# Patient Record
Sex: MALE | Race: WHITE | NOT HISPANIC OR LATINO | Employment: UNEMPLOYED | URBAN - METROPOLITAN AREA
[De-identification: names, ages, dates, MRNs, and addresses within clinical notes are randomized per-mention and may not be internally consistent; named-entity substitution may affect disease eponyms.]

---

## 2023-05-16 ENCOUNTER — HOSPITAL ENCOUNTER (EMERGENCY)
Facility: HOSPITAL | Age: 43
End: 2023-05-16
Attending: EMERGENCY MEDICINE

## 2023-05-16 ENCOUNTER — HOSPITAL ENCOUNTER (INPATIENT)
Facility: HOSPITAL | Age: 43
LOS: 3 days | Discharge: HOME | End: 2023-05-19
Attending: STUDENT IN AN ORGANIZED HEALTH CARE EDUCATION/TRAINING PROGRAM | Admitting: STUDENT IN AN ORGANIZED HEALTH CARE EDUCATION/TRAINING PROGRAM

## 2023-05-16 VITALS
SYSTOLIC BLOOD PRESSURE: 132 MMHG | TEMPERATURE: 98 F | OXYGEN SATURATION: 99 % | WEIGHT: 200 LBS | HEIGHT: 70 IN | HEART RATE: 70 BPM | BODY MASS INDEX: 28.63 KG/M2 | DIASTOLIC BLOOD PRESSURE: 74 MMHG | RESPIRATION RATE: 18 BRPM

## 2023-05-16 DIAGNOSIS — Z00.8 MEDICAL CLEARANCE FOR PSYCHIATRIC ADMISSION: Primary | ICD-10-CM

## 2023-05-16 DIAGNOSIS — Z00.8 ENCOUNTER FOR PSYCHOLOGICAL EVALUATION: ICD-10-CM

## 2023-05-16 DIAGNOSIS — S61.512A LACERATION OF LEFT WRIST, INITIAL ENCOUNTER: ICD-10-CM

## 2023-05-16 DIAGNOSIS — F33.9 RECURRENT MAJOR DEPRESSIVE DISORDER, REMISSION STATUS UNSPECIFIED (HCC): Primary | ICD-10-CM

## 2023-05-16 DIAGNOSIS — Z00.8 MEDICAL CLEARANCE FOR PSYCHIATRIC ADMISSION: ICD-10-CM

## 2023-05-16 LAB
AMPHETAMINES SERPL QL SCN: POSITIVE
BARBITURATES UR QL: NEGATIVE
BENZODIAZ UR QL: NEGATIVE
COCAINE UR QL: NEGATIVE
ETHANOL EXG-MCNC: 0 MG/DL
METHADONE UR QL: NEGATIVE
OPIATES UR QL SCN: NEGATIVE
OXYCODONE+OXYMORPHONE UR QL SCN: NEGATIVE
PCP UR QL: NEGATIVE
SARS-COV-2 RNA RESP QL NAA+PROBE: NEGATIVE
THC UR QL: NEGATIVE

## 2023-05-16 RX ORDER — LORAZEPAM 2 MG/ML
1 INJECTION INTRAMUSCULAR
Status: CANCELLED | OUTPATIENT
Start: 2023-05-16

## 2023-05-16 RX ORDER — ACETAMINOPHEN 325 MG/1
975 TABLET ORAL EVERY 6 HOURS PRN
Status: DISCONTINUED | OUTPATIENT
Start: 2023-05-16 | End: 2023-05-19 | Stop reason: HOSPADM

## 2023-05-16 RX ORDER — HALOPERIDOL 5 MG/1
5 TABLET ORAL
Status: DISCONTINUED | OUTPATIENT
Start: 2023-05-16 | End: 2023-05-19 | Stop reason: HOSPADM

## 2023-05-16 RX ORDER — BENZTROPINE MESYLATE 1 MG/1
1 TABLET ORAL
Status: CANCELLED | OUTPATIENT
Start: 2023-05-16

## 2023-05-16 RX ORDER — TRAZODONE HYDROCHLORIDE 50 MG/1
50 TABLET ORAL
Status: DISCONTINUED | OUTPATIENT
Start: 2023-05-16 | End: 2023-05-19 | Stop reason: HOSPADM

## 2023-05-16 RX ORDER — HYDROXYZINE HYDROCHLORIDE 25 MG/1
25 TABLET, FILM COATED ORAL
Status: CANCELLED | OUTPATIENT
Start: 2023-05-16

## 2023-05-16 RX ORDER — DEXTROAMPHETAMINE SACCHARATE, AMPHETAMINE ASPARTATE, DEXTROAMPHETAMINE SULFATE AND AMPHETAMINE SULFATE 5; 5; 5; 5 MG/1; MG/1; MG/1; MG/1
20 TABLET ORAL
COMMUNITY

## 2023-05-16 RX ORDER — HALOPERIDOL 2 MG/1
2 TABLET ORAL
Status: DISCONTINUED | OUTPATIENT
Start: 2023-05-16 | End: 2023-05-19 | Stop reason: HOSPADM

## 2023-05-16 RX ORDER — LORAZEPAM 2 MG/ML
2 INJECTION INTRAMUSCULAR
Status: CANCELLED | OUTPATIENT
Start: 2023-05-16

## 2023-05-16 RX ORDER — HYDROXYZINE HYDROCHLORIDE 25 MG/1
100 TABLET, FILM COATED ORAL
Status: CANCELLED | OUTPATIENT
Start: 2023-05-16

## 2023-05-16 RX ORDER — HYDROXYZINE HYDROCHLORIDE 25 MG/1
50 TABLET, FILM COATED ORAL
Status: CANCELLED | OUTPATIENT
Start: 2023-05-16

## 2023-05-16 RX ORDER — TRAZODONE HYDROCHLORIDE 50 MG/1
50 TABLET ORAL
Status: CANCELLED | OUTPATIENT
Start: 2023-05-16

## 2023-05-16 RX ORDER — LORAZEPAM 2 MG/ML
2 INJECTION INTRAMUSCULAR
Status: DISCONTINUED | OUTPATIENT
Start: 2023-05-16 | End: 2023-05-19 | Stop reason: HOSPADM

## 2023-05-16 RX ORDER — BENZTROPINE MESYLATE 1 MG/ML
0.5 INJECTION INTRAMUSCULAR; INTRAVENOUS
Status: CANCELLED | OUTPATIENT
Start: 2023-05-16

## 2023-05-16 RX ORDER — BENZTROPINE MESYLATE 1 MG/1
1 TABLET ORAL
Status: DISCONTINUED | OUTPATIENT
Start: 2023-05-16 | End: 2023-05-19 | Stop reason: HOSPADM

## 2023-05-16 RX ORDER — HALOPERIDOL 5 MG/ML
5 INJECTION INTRAMUSCULAR
Status: DISCONTINUED | OUTPATIENT
Start: 2023-05-16 | End: 2023-05-19 | Stop reason: HOSPADM

## 2023-05-16 RX ORDER — AMOXICILLIN 250 MG
1 CAPSULE ORAL DAILY PRN
Status: DISCONTINUED | OUTPATIENT
Start: 2023-05-16 | End: 2023-05-19 | Stop reason: HOSPADM

## 2023-05-16 RX ORDER — MAGNESIUM HYDROXIDE/ALUMINUM HYDROXICE/SIMETHICONE 120; 1200; 1200 MG/30ML; MG/30ML; MG/30ML
30 SUSPENSION ORAL EVERY 4 HOURS PRN
Status: DISCONTINUED | OUTPATIENT
Start: 2023-05-16 | End: 2023-05-19 | Stop reason: HOSPADM

## 2023-05-16 RX ORDER — HALOPERIDOL 2 MG/1
2 TABLET ORAL
Status: CANCELLED | OUTPATIENT
Start: 2023-05-16

## 2023-05-16 RX ORDER — LORAZEPAM 0.5 MG/1
0.5 TABLET ORAL ONCE
Status: COMPLETED | OUTPATIENT
Start: 2023-05-16 | End: 2023-05-16

## 2023-05-16 RX ORDER — BENZTROPINE MESYLATE 1 MG/ML
1 INJECTION INTRAMUSCULAR; INTRAVENOUS
Status: DISCONTINUED | OUTPATIENT
Start: 2023-05-16 | End: 2023-05-19 | Stop reason: HOSPADM

## 2023-05-16 RX ORDER — BENZTROPINE MESYLATE 1 MG/ML
0.5 INJECTION INTRAMUSCULAR; INTRAVENOUS
Status: DISCONTINUED | OUTPATIENT
Start: 2023-05-16 | End: 2023-05-19 | Stop reason: HOSPADM

## 2023-05-16 RX ORDER — GINSENG 100 MG
1 CAPSULE ORAL DAILY PRN
Status: DISCONTINUED | OUTPATIENT
Start: 2023-05-16 | End: 2023-05-19 | Stop reason: HOSPADM

## 2023-05-16 RX ORDER — ACETAMINOPHEN 325 MG/1
975 TABLET ORAL EVERY 6 HOURS PRN
Status: CANCELLED | OUTPATIENT
Start: 2023-05-16

## 2023-05-16 RX ORDER — MAGNESIUM HYDROXIDE/ALUMINUM HYDROXICE/SIMETHICONE 120; 1200; 1200 MG/30ML; MG/30ML; MG/30ML
30 SUSPENSION ORAL EVERY 4 HOURS PRN
Status: CANCELLED | OUTPATIENT
Start: 2023-05-16

## 2023-05-16 RX ORDER — ACETAMINOPHEN 325 MG/1
650 TABLET ORAL EVERY 6 HOURS PRN
Status: CANCELLED | OUTPATIENT
Start: 2023-05-16

## 2023-05-16 RX ORDER — POLYETHYLENE GLYCOL 3350 17 G/17G
17 POWDER, FOR SOLUTION ORAL DAILY PRN
Status: CANCELLED | OUTPATIENT
Start: 2023-05-16

## 2023-05-16 RX ORDER — DIPHENHYDRAMINE HYDROCHLORIDE 50 MG/ML
50 INJECTION INTRAMUSCULAR; INTRAVENOUS EVERY 6 HOURS PRN
Status: CANCELLED | OUTPATIENT
Start: 2023-05-16

## 2023-05-16 RX ORDER — ACETAMINOPHEN 325 MG/1
650 TABLET ORAL EVERY 4 HOURS PRN
Status: CANCELLED | OUTPATIENT
Start: 2023-05-16

## 2023-05-16 RX ORDER — HYDROXYZINE 50 MG/1
50 TABLET, FILM COATED ORAL
Status: DISCONTINUED | OUTPATIENT
Start: 2023-05-16 | End: 2023-05-19 | Stop reason: HOSPADM

## 2023-05-16 RX ORDER — LORAZEPAM 2 MG/ML
2 INJECTION INTRAMUSCULAR EVERY 6 HOURS PRN
Status: CANCELLED | OUTPATIENT
Start: 2023-05-16

## 2023-05-16 RX ORDER — SERTRALINE HYDROCHLORIDE 100 MG/1
150 TABLET, FILM COATED ORAL DAILY
COMMUNITY
End: 2023-05-19

## 2023-05-16 RX ORDER — GINSENG 100 MG
1 CAPSULE ORAL ONCE
Status: COMPLETED | OUTPATIENT
Start: 2023-05-16 | End: 2023-05-16

## 2023-05-16 RX ORDER — PROPRANOLOL HYDROCHLORIDE 20 MG/1
10 TABLET ORAL EVERY 8 HOURS PRN
Status: CANCELLED | OUTPATIENT
Start: 2023-05-16

## 2023-05-16 RX ORDER — HALOPERIDOL 5 MG/ML
2.5 INJECTION INTRAMUSCULAR
Status: CANCELLED | OUTPATIENT
Start: 2023-05-16

## 2023-05-16 RX ORDER — AMOXICILLIN 250 MG
1 CAPSULE ORAL DAILY PRN
Status: CANCELLED | OUTPATIENT
Start: 2023-05-16

## 2023-05-16 RX ORDER — LORAZEPAM 2 MG/ML
1 INJECTION INTRAMUSCULAR
Status: DISCONTINUED | OUTPATIENT
Start: 2023-05-16 | End: 2023-05-19 | Stop reason: HOSPADM

## 2023-05-16 RX ORDER — HALOPERIDOL 5 MG/ML
2.5 INJECTION INTRAMUSCULAR
Status: DISCONTINUED | OUTPATIENT
Start: 2023-05-16 | End: 2023-05-19 | Stop reason: HOSPADM

## 2023-05-16 RX ORDER — HALOPERIDOL 10 MG/1
5 TABLET ORAL
Status: CANCELLED | OUTPATIENT
Start: 2023-05-16

## 2023-05-16 RX ORDER — DIPHENHYDRAMINE HYDROCHLORIDE 50 MG/ML
50 INJECTION INTRAMUSCULAR; INTRAVENOUS EVERY 6 HOURS PRN
Status: DISCONTINUED | OUTPATIENT
Start: 2023-05-16 | End: 2023-05-19 | Stop reason: HOSPADM

## 2023-05-16 RX ORDER — BENZTROPINE MESYLATE 1 MG/ML
1 INJECTION INTRAMUSCULAR; INTRAVENOUS
Status: CANCELLED | OUTPATIENT
Start: 2023-05-16

## 2023-05-16 RX ORDER — LIDOCAINE HYDROCHLORIDE AND EPINEPHRINE 10; 10 MG/ML; UG/ML
1 INJECTION, SOLUTION INFILTRATION; PERINEURAL ONCE
Status: COMPLETED | OUTPATIENT
Start: 2023-05-16 | End: 2023-05-16

## 2023-05-16 RX ORDER — ACETAMINOPHEN 325 MG/1
650 TABLET ORAL EVERY 6 HOURS PRN
Status: DISCONTINUED | OUTPATIENT
Start: 2023-05-16 | End: 2023-05-19 | Stop reason: HOSPADM

## 2023-05-16 RX ORDER — POLYETHYLENE GLYCOL 3350 17 G/17G
17 POWDER, FOR SOLUTION ORAL DAILY PRN
Status: DISCONTINUED | OUTPATIENT
Start: 2023-05-16 | End: 2023-05-16

## 2023-05-16 RX ORDER — GINSENG 100 MG
1 CAPSULE ORAL DAILY PRN
Status: CANCELLED | OUTPATIENT
Start: 2023-05-16

## 2023-05-16 RX ORDER — HYDROXYZINE 50 MG/1
100 TABLET, FILM COATED ORAL
Status: DISCONTINUED | OUTPATIENT
Start: 2023-05-16 | End: 2023-05-19 | Stop reason: HOSPADM

## 2023-05-16 RX ORDER — LORAZEPAM 2 MG/ML
2 INJECTION INTRAMUSCULAR EVERY 6 HOURS PRN
Status: DISCONTINUED | OUTPATIENT
Start: 2023-05-16 | End: 2023-05-19 | Stop reason: HOSPADM

## 2023-05-16 RX ORDER — HYDROXYZINE HYDROCHLORIDE 25 MG/1
25 TABLET, FILM COATED ORAL
Status: DISCONTINUED | OUTPATIENT
Start: 2023-05-16 | End: 2023-05-19 | Stop reason: HOSPADM

## 2023-05-16 RX ORDER — POLYETHYLENE GLYCOL 3350 17 G/17G
17 POWDER, FOR SOLUTION ORAL DAILY PRN
Status: DISCONTINUED | OUTPATIENT
Start: 2023-05-16 | End: 2023-05-19 | Stop reason: HOSPADM

## 2023-05-16 RX ORDER — HALOPERIDOL 5 MG/ML
5 INJECTION INTRAMUSCULAR
Status: CANCELLED | OUTPATIENT
Start: 2023-05-16

## 2023-05-16 RX ORDER — PROPRANOLOL HYDROCHLORIDE 10 MG/1
10 TABLET ORAL EVERY 8 HOURS PRN
Status: DISCONTINUED | OUTPATIENT
Start: 2023-05-16 | End: 2023-05-19 | Stop reason: HOSPADM

## 2023-05-16 RX ORDER — BISACODYL 10 MG
10 SUPPOSITORY, RECTAL RECTAL DAILY PRN
Status: CANCELLED | OUTPATIENT
Start: 2023-05-16

## 2023-05-16 RX ORDER — ACETAMINOPHEN 325 MG/1
650 TABLET ORAL EVERY 4 HOURS PRN
Status: DISCONTINUED | OUTPATIENT
Start: 2023-05-16 | End: 2023-05-19 | Stop reason: HOSPADM

## 2023-05-16 RX ORDER — BISACODYL 10 MG
10 SUPPOSITORY, RECTAL RECTAL DAILY PRN
Status: DISCONTINUED | OUTPATIENT
Start: 2023-05-16 | End: 2023-05-16

## 2023-05-16 RX ORDER — BISACODYL 10 MG
10 SUPPOSITORY, RECTAL RECTAL DAILY PRN
Status: DISCONTINUED | OUTPATIENT
Start: 2023-05-16 | End: 2023-05-19 | Stop reason: HOSPADM

## 2023-05-16 RX ORDER — NICOTINE 21 MG/24HR
21 PATCH, TRANSDERMAL 24 HOURS TRANSDERMAL ONCE
Status: DISCONTINUED | OUTPATIENT
Start: 2023-05-16 | End: 2023-05-16 | Stop reason: HOSPADM

## 2023-05-16 RX ADMIN — NICOTINE 21 MG: 21 PATCH, EXTENDED RELEASE TRANSDERMAL at 14:05

## 2023-05-16 RX ADMIN — TETANUS TOXOID, REDUCED DIPHTHERIA TOXOID AND ACELLULAR PERTUSSIS VACCINE, ADSORBED 0.5 ML: 5; 2.5; 8; 8; 2.5 SUSPENSION INTRAMUSCULAR at 11:55

## 2023-05-16 RX ADMIN — BACITRACIN 1 SMALL APPLICATION: 500 OINTMENT TOPICAL at 11:55

## 2023-05-16 RX ADMIN — ACETAMINOPHEN 975 MG: 325 TABLET, FILM COATED ORAL at 17:19

## 2023-05-16 RX ADMIN — NICOTINE POLACRILEX 2 MG: 2 GUM, CHEWING BUCCAL at 17:19

## 2023-05-16 RX ADMIN — LORAZEPAM 0.5 MG: 0.5 TABLET ORAL at 12:36

## 2023-05-16 RX ADMIN — HYDROXYZINE HYDROCHLORIDE 50 MG: 50 TABLET, FILM COATED ORAL at 20:06

## 2023-05-16 RX ADMIN — LIDOCAINE HYDROCHLORIDE,EPINEPHRINE BITARTRATE 1 ML: 10; .01 INJECTION, SOLUTION INFILTRATION; PERINEURAL at 11:55

## 2023-05-16 NOTE — ED NOTES
Insurance Authorization for admission:   Phone call placed to [facility]  Phone number:  [phone]      Spoke to Mark Wall 136 days approved  Level of care: inpatient Mental health  Review on 5/22/23  Authorization # Nathan Montalvo  [id number]

## 2023-05-16 NOTE — NURSING NOTE
Pt is a 89 from [address]  Presented to ED with police after intentional suicide attempt via cutting L wrist with pocket knife  Wound closed with 3 stitches, mild bruising appears to be starting around stitched area; otherwise wound clean and no signs of infection  Reports passive SI, but able to consent for safety  Denies HI/AVH  Reported to ED that he is homeless (living in a hotel) and was fired from his job this morning  Pt signed 72 HR notice 5/16/23 @ [phone]

## 2023-05-16 NOTE — ED NOTES
MARILYN Hough    Recipient ID: [id number]    [address] FAMILY  Information Contact  Telephone: [phone] [facility]  Information Contact  Telephone: [phone]

## 2023-05-16 NOTE — ED PROVIDER NOTES
"History  Chief Complaint   Patient presents with   • Psychiatric Evaluation     Pt was found in hotel, gf called the hotel for welfare check  Pt lost job this morning  Pt attempted to kill himself with cutting left wrist with pocket knife, pills were scattered across blood  Pt states \"I just dont give a fuck anymore and as soon as I get out of here ill do it again with whatever comes along\"  on scene and removed pocket knife  Pt admits to drinking 1 beer, 2 adderall and 1 Zoloft pt with poor eye contact  Patient is a 77-year-old white male with history of ADHD who reports he lost his job as a  this morning  He spoke to his girlfriend from the hotel where he was staying  She called police to do a welfare check on patient  Patient reports stabbing in his left wrist with a pocket knife  Denies HI  Unsure of his last tetanus shot  States he drank 1 beer this morning  Denies illicit drug use  Patient reports \"soon as I get out of here I will do it again\"  Prior to Admission Medications   Prescriptions Last Dose Informant Patient Reported? Taking? amphetamine-dextroamphetamine (ADDERALL) 20 mg tablet 5/16/2023  Yes Yes   Sig: Take 20 mg by mouth 2 (two) times a day   sertraline (ZOLOFT) 100 mg tablet 5/16/2023  Yes Yes   Sig: Take 150 mg by mouth daily      Facility-Administered Medications: None       History reviewed  No pertinent past medical history  History reviewed  No pertinent surgical history  History reviewed  No pertinent family history  I have reviewed and agree with the history as documented  E-Cigarette/Vaping   • E-Cigarette Use Never User      E-Cigarette/Vaping Substances   • Nicotine Yes      Social History     Tobacco Use   • Smoking status: Every Day     Packs/day: 1 00     Types: Cigarettes   • Smokeless tobacco: Never   Vaping Use   • Vaping Use: Never used   Substance Use Topics   • Alcohol use:  Yes     Alcohol/week: 21 0 standard drinks     " Types: 21 Cans of beer per week     Comment: 23- beers daily   • Drug use: Never       Review of Systems   Constitutional: Negative for chills and fever  HENT: Negative for ear pain and sore throat  Respiratory: Negative for cough and shortness of breath  Cardiovascular: Negative for chest pain and palpitations  Gastrointestinal: Negative for abdominal pain and vomiting  Genitourinary: Negative for hematuria  Musculoskeletal: Negative for arthralgias and back pain  Skin: Positive for wound  Negative for color change and rash  Neurological: Negative for syncope  Psychiatric/Behavioral: Positive for self-injury and suicidal ideas  All other systems reviewed and are negative  Physical Exam  Physical Exam  Vitals and nursing note reviewed  Constitutional:       General: He is not in acute distress  Appearance: He is not ill-appearing, toxic-appearing or diaphoretic  Comments: Poor eye contact   HENT:      Head: Normocephalic and atraumatic  Right Ear: Tympanic membrane, ear canal and external ear normal       Left Ear: Tympanic membrane and ear canal normal       Nose: Nose normal       Mouth/Throat:      Mouth: Mucous membranes are moist       Pharynx: Oropharynx is clear  Eyes:      Extraocular Movements: Extraocular movements intact  Conjunctiva/sclera: Conjunctivae normal       Pupils: Pupils are equal, round, and reactive to light  Cardiovascular:      Rate and Rhythm: Normal rate and regular rhythm  Pulses: Normal pulses  Heart sounds: Normal heart sounds  Pulmonary:      Effort: Pulmonary effort is normal       Breath sounds: Normal breath sounds  Abdominal:      General: Abdomen is flat  Bowel sounds are normal       Palpations: Abdomen is soft  Musculoskeletal:         General: Normal range of motion  Cervical back: Normal range of motion and neck supple  Skin:     General: Skin is warm and dry        Comments: 1 5 cm linear laceration left flexor wrist    Neurological:      General: No focal deficit present  Mental Status: He is alert and oriented to person, place, and time  Mental status is at baseline  Vital Signs  ED Triage Vitals [05/16/23 1120]   Temperature Pulse Respirations Blood Pressure SpO2   97 6 °F (36 4 °C) 77 16 128/72 97 %      Temp Source Heart Rate Source Patient Position - Orthostatic VS BP Location FiO2 (%)   Temporal Monitor Lying Right arm --      Pain Score       No Pain           Vitals:    05/16/23 1120 05/16/23 1219 05/16/23 1618   BP: 128/72 134/84 132/74   Pulse: 77 74 70   Patient Position - Orthostatic VS: Lying Lying Sitting         Visual Acuity      ED Medications  Medications   tetanus-diphtheria-acellular pertussis (BOOSTRIX) IM injection 0 5 mL (0 5 mL Intramuscular Given 5/16/23 1155)   lidocaine-epinephrine (XYLOCAINE/EPINEPHRINE) 1 %-1:100,000 injection 1 mL (1 mL Infiltration Given 5/16/23 1155)   bacitracin topical ointment 1 small application (1 small application Topical Given 5/16/23 1155)   LORazepam (ATIVAN) tablet 0 5 mg (0 5 mg Oral Given 5/16/23 1236)       Diagnostic Studies  Results Reviewed     Procedure Component Value Units Date/Time    COVID only [[id number]]  (Normal) Collected: 05/16/23 1141    Lab Status: Final result Specimen: Nares from Nose Updated: 05/16/23 1217     SARS-CoV-2 Negative    Narrative:      FOR PEDIATRIC PATIENTS - copy/paste COVID Guidelines URL to browser: [email or web address]    SARS-CoV-2 assay is a Nucleic Acid Amplification assay intended for the  qualitative detection of nucleic acid from SARS-CoV-2 in nasopharyngeal  swabs  Results are for the presumptive identification of SARS-CoV-2 RNA  Positive results are indicative of infection with SARS-CoV-2, the virus  causing COVID-19, but do not rule out bacterial infection or co-infection  with other viruses   Laboratories within the [place] and its  territories are required to report all positive results to the appropriate  public health authorities  Negative results do not preclude SARS-CoV-2  infection and should not be used as the sole basis for treatment or other  patient management decisions  Negative results must be combined with  clinical observations, patient history, and epidemiological information  This test has not been FDA cleared or approved  This test has been authorized by FDA under an Emergency Use Authorization  (EUA)  This test is only authorized for the duration of time the  declaration that circumstances exist justifying the authorization of the  emergency use of an in vitro diagnostic tests for detection of SARS-CoV-2  virus and/or diagnosis of COVID-19 infection under section 564(b)(1) of  the Act, 21 U  S C  190REZ-9(E)(8), unless the authorization is terminated  or revoked sooner  The test has been validated but independent review by FDA  and CLIA is pending  Test performed using [employer] GeneXpert: This RT-PCR assay targets N2,  a region unique to SARS-CoV-2  A conserved region in the E-gene was chosen  for pan-Sarbecovirus detection which includes SARS-CoV-2  According to CMS-2020-01-R, this platform meets the definition of high-throughput technology  Rapid drug screen, urine [[id number]]  (Abnormal) Collected: 05/16/23 1149    Lab Status: Final result Specimen: Urine, Clean Catch Updated: 05/16/23 1208     Amph/Meth UR Positive     Barbiturate Ur Negative     Benzodiazepine Urine Negative     Cocaine Urine Negative     Methadone Urine Negative     Opiate Urine Negative     PCP Ur Negative     THC Urine Negative     Oxycodone Urine Negative    Narrative:      FOR MEDICAL PURPOSES ONLY  IF CONFIRMATION NEEDED PLEASE CONTACT THE LAB WITHIN 5 DAYS      Drug Screen Cutoff Levels:  AMPHETAMINE/METHAMPHETAMINES  1000 ng/mL  BARBITURATES     200 ng/mL  BENZODIAZEPINES     200 ng/mL  COCAINE      300 ng/mL  METHADONE      300 "ng/mL  OPIATES      300 ng/mL  PHENCYCLIDINE     25 ng/mL  THC       50 ng/mL  OXYCODONE      100 ng/mL    POCT alcohol breath test [[id number]]  (Normal) Resulted: 05/16/23 1141    Lab Status: Final result Updated: 05/16/23 1141     EXTBreath Alcohol 0 000                 No orders to display              Procedures  Laceration repair    Date/Time: 5/16/2023 4:29 PM  Performed by: Belkys Crabtree PA-C  Authorized by: Belkys Crabtree PA-C   Consent: Verbal consent obtained  Risks and benefits: risks, benefits and alternatives were discussed  Consent given by: patient  Patient understanding: patient states understanding of the procedure being performed  Patient consent: the patient's understanding of the procedure matches consent given  Procedure consent: procedure consent matches procedure scheduled  Relevant documents: relevant documents present and verified  Test results: test results available and properly labeled  Site marked: the operative site was marked  Radiology Images displayed and confirmed  If images not available, report reviewed: imaging studies available  Patient identity confirmed: verbally with patient and arm band  Time out: Immediately prior to procedure a \"time out\" was called to verify the correct patient, procedure, equipment, support staff and site/side marked as required  Body area: upper extremity  Location details: left wrist  Laceration length: 1 5 cm  Foreign bodies: no foreign bodies  Tendon involvement: none  Nerve involvement: none  Vascular damage: no  Anesthesia: local infiltration    Anesthesia:  Local Anesthetic: lidocaine 1% with epinephrine  Anesthetic total: 2 mL    Sedation:  Patient sedated: no      Wound Dehiscence:  Superficial Wound Dehiscence: simple closure      Procedure Details:  Preparation: Patient was prepped and draped in the usual sterile fashion    Irrigation solution: saline  Irrigation method: syringe  Amount of cleaning: standard  Debridement: " none  Degree of undermining: none  Skin closure: 4-0 nylon  Number of sutures: 3  Technique: simple  Approximation: close  Approximation difficulty: simple  Dressing: antibiotic ointment  Patient tolerance: patient tolerated the procedure well with no immediate complications  Comments: Band aid                ED Course  ED Course as of 05/16/23 1740   Tue May 16, 2023   1631 Patient is medically cleared for [facility] [address] evaluation                                SBIRT 22yo+    Flowsheet Row Most Recent Value   Initial Alcohol Screen: US AUDIT-C     1  How often do you have a drink containing alcohol? 3 Filed at: 05/16/2023 1125   2  How many drinks containing alcohol do you have on a typical day you are drinking? 2 Filed at: 05/16/2023 1125   3a  Male UNDER 65: How often do you have five or more drinks on one occasion? 0 Filed at: 05/16/2023 1125   3b  FEMALE Any Age, or MALE 65+: How often do you have 4 or more drinks on one occassion? 0 Filed at: 05/16/2023 1125   Audit-C Score 5 Filed at: 05/16/2023 1125   VANESSA: How many times in the past year have you    Used an illegal drug or used a prescription medication for non-medical reasons? Never Filed at: 05/16/2023 1125                    Medical Decision Making  Patient medically cleared for behavioral health evaluation  Patient accepted in transfer to [address] [place] [address] wrist laceration anesthetized with 1% lidocaine with epinephrine, prepped and drapped in usual sterile fashion and closed with 3 x 4-0 ethilon sutures  Bacitracin ointment and dsd  Applied  Wound care instructions given  Suture removal in 10 days at primary care provider  Amount and/or Complexity of Data Reviewed  Labs: ordered  Risk  OTC drugs  Prescription drug management  Decision regarding hospitalization            Disposition  Final diagnoses:   Encounter for psychological evaluation   Laceration of left wrist, initial encounter     Time reflects when diagnosis was documented in both MDM as applicable and the Disposition within this note     Time User Action Codes Description Comment    5/16/2023  1:42 PM Marshall Clarke Add [Z00 8] Medical clearance for psychiatric admission     5/16/2023  2:30 PM Mello Swann [facility] [Z00 8] Encounter for psychological evaluation     5/16/2023  5:39 PM Anahi Lazo Add [B92 [id number]] Laceration of left wrist, initial encounter       ED Disposition     ED Disposition   Transfer to [address] [place] [address]   --    Date/Time   Tue May 16, 2023  1:27 PM    Comment   Cathy Da Silva should be transferred out to behavioral health  and has been medically cleared             MD Documentation    Hina Naranjo Most Recent Value   Patient Condition The patient has been stabilized such that within reasonable medical probability, no material deterioration of the patient condition or the condition of the unborn child(yesika) is likely to result from the transfer   Reason for Transfer Level of Care needed not available at this facility   Benefits of Transfer Continuity of care   Risks of Transfer Potential for delay in receiving treatment, Potential deterioration of medical condition   Accepting Physician El Pappas    (Name & Tel number) Jorden Horvath   Sending MD Dr Nahid Dunn   Provider Certification General risk, such as traffic hazards, adverse weather conditions, rough terrain or turbulence, possible failure of equipment (including vehicle or aircraft), or consequences of actions of persons outside the control of the transport personnel      RN Documentation    [address]    (Name & Tel number) [facility]      Follow-up Information    None         Discharge Medication List as of 5/16/2023  4:22 PM      CONTINUE these medications which have NOT CHANGED    Details   amphetamine-dextroamphetamine (ADDERALL) 20 mg tablet Take 20 mg by mouth 2 (two) times a day, Historical Med      sertraline (ZOLOFT) 100 mg tablet Take 150 mg by mouth daily, Historical Med             No discharge procedures on file      PDMP Review       Value Time User    PDMP Reviewed  Yes 5/16/2023  1:36 PM Camryn Silva PA-C          ED Provider  Electronically Signed by           Tish Fischer PA-C  05/16/23 0843

## 2023-05-16 NOTE — ED NOTES
Discharged to [facility] with Royer Akbar, JENNIFER  05/16/23 7596

## 2023-05-16 NOTE — NURSING NOTE
Joe      Eure-Shania   CDL PA  business cards   6 DOLLARS   [id number] VISA DEBIT   PICTURE   insurances cards   ebt card   [id number] visa debit     Bedside   Shoes   Cargo pants gray   Orange shirt   White shirt   Socks boxers

## 2023-05-16 NOTE — PLAN OF CARE
Problem: SELF HARM/SUICIDALITY  Goal: Will have no self-injury during hospital stay  Description: INTERVENTIONS:  - Q 15 MINUTES: Routine safety checks  - Q WAKING SHIFT & PRN: Assess risk to determine if routine checks are adequate to maintain patient safety  - Encourage patient to participate actively in care by formulating a plan to combat response to suicidal ideation, identify supports and resources  Outcome: Progressing     Problem: BEHAVIOR  Goal: Pt/Family maintain appropriate behavior and adhere to behavioral management agreement, if implemented  Description: INTERVENTIONS:  - Assess the family dynamic   - Encourage verbalization of thoughts and concerns in a socially appropriate manner  - Assess patient/family's coping skills and non-compliant behavior (including use of illegal substances)  - Utilize positive, consistent limit setting strategies supporting safety of patient, staff and others  - Initiate consult with Case Management, Spiritual Care or other ancillary services as appropriate  - If a patient's/visitor's behavior jeopardizes the safety of the patient, staff, or others, refer to organization procedure     - Notify Security of behavior or suspected illegal substances which indicate the need for search of the patient and/or belongings  - Encourage participation in the decision making process about a behavioral management agreement; implement if patient meets criteria  Outcome: Progressing     Problem: ANXIETY  Goal: Will report anxiety at manageable levels  Description: INTERVENTIONS:  - Administer medication as ordered  - Teach and encourage coping skills  - Provide emotional support  - Assess patient/family for anxiety and ability to cope  Outcome: Progressing  Goal: By discharge: Patient will verbalize 2 strategies to deal with anxiety  Description: Interventions:  - Identify any obvious source/trigger to anxiety  - Staff will assist patient in applying identified coping technique/skills  - Encourage attendance of scheduled groups and activities  Outcome: Progressing     Problem: SLEEP DISTURBANCE  Goal: Will exhibit normal sleeping pattern  Description: Interventions:  -  Assess the patients sleep pattern, noting recent changes  - Administer medication as ordered  - Decrease environmental stimuli, including noise, as appropriate during the night  - Encourage the patient to actively participate in unit groups and or exercise during the day to enhance ability to achieve adequate sleep at night  - Assess the patient, in the morning, encouraging a description of sleep experience  Outcome: Progressing     Problem: SELF CARE DEFICIT  Goal: Return ADL status to a safe level of function  Description: INTERVENTIONS:  - Administer medication as ordered  - Assess ADL deficits and provide assistive devices as needed  - Obtain PT/OT consults as needed  - Assist and instruct patient to increase activity and self care as tolerated  Outcome: Progressing

## 2023-05-16 NOTE — ED NOTES
Patient is accepted at [facility]  Patient is accepted by Dr Sly Darden per [facility] is arranged with [facility]  Transportation is scheduled for [phone]  Patient may go to the floor at         Nurse report is to be called to [phone] prior to patient transfer

## 2023-05-16 NOTE — ED NOTES
Non-PAR information given to [facility] rep, Shyla, to obtain non-PAR agreement  Sarah informed at UR and pt can be setup with transport

## 2023-05-16 NOTE — EMTALA/ACUTE CARE TRANSFER
[facility] EMERGENCY DEPARTMENT  [address] [facility] [address]  Dept: [phone]      QUPUVB TRANSFER CONSENT    NAME Annie Russ                                         1980                              MRN [id number]    I have been informed of my rights regarding examination, treatment, and transfer   by Dr Radha Mike DO    Benefits: Continuity of care    Risks: Potential for delay in receiving treatment, Potential deterioration of medical condition      Consent for Transfer:  I acknowledge that my medical condition has been evaluated and explained to me by the emergency department physician or other qualified medical person and/or my attending physician, who has recommended that I be transferred to the service of  Accepting Physician: Dr Gabbi Zamora at [address] Name, [address] : Renae Da Silva  The above potential benefits of such transfer, the potential risks associated with such transfer, and the probable risks of not being transferred have been explained to me, and I fully understand them  The doctor has explained that, in my case, the benefits of transfer outweigh the risks  I agree to be transferred  I authorize the performance of emergency medical procedures and treatments upon me in both transit and upon arrival at the receiving facility  Additionally, I authorize the release of any and all medical records to the receiving facility and request they be transported with me, if possible  I understand that the safest mode of transportation during a medical emergency is an ambulance and that the Hospital advocates the use of this mode of transport  Risks of traveling to the receiving facility by car, including absence of medical control, life sustaining equipment, such as oxygen, and medical personnel has been explained to me and I fully understand them      (ANABELLE CORRECT BOX BELOW)  [  ]  I consent to the stated transfer and to be transported by ambulance/helicopter  [  ]  I consent to the stated transfer, but refuse transportation by ambulance and accept full responsibility for my transportation by car  I understand the risks of non-ambulance transfers and I exonerate the Hospital and its staff from any deterioration in my condition that results from this refusal     X___________________________________________    DATE  23  TIME________  Signature of patient or legally responsible individual signing on patient behalf           RELATIONSHIP TO PATIENT_________________________          Provider Certification    NAME Talon Inman                                         1980                              MRN [id number]    A medical screening exam was performed on the above named patient  Based on the examination:    Condition Necessitating Transfer The primary encounter diagnosis was Medical clearance for psychiatric admission  A diagnosis of Encounter for psychological evaluation was also pertinent to this visit  Patient Condition: The patient has been stabilized such that within reasonable medical probability, no material deterioration of the patient condition or the condition of the unborn child(yesika) is likely to result from the transfer    Reason for Transfer: Level of Care needed not available at this facility    Transfer Requirements: [facility] [address]   · Space available and qualified personnel available for treatment as acknowledged by [facility]  · Agreed to accept transfer and to provide appropriate medical treatment as acknowledged by       Dr Merlene Dodson  · Appropriate medical records of the examination and treatment of the patient are provided at the time of transfer   [address] _______  · Transfer will be performed by qualified personnel from    and appropriate transfer equipment as required, including the use of necessary and appropriate life support measures      Provider Certification: I have examined the patient and explained the following risks and benefits of being transferred/refusing transfer to the patient/family:  General risk, such as traffic hazards, adverse weather conditions, rough terrain or turbulence, possible failure of equipment (including vehicle or aircraft), or consequences of actions of persons outside the control of the transport personnel      Based on these reasonable risks and benefits to the patient and/or the unborn child(yesika), and based upon the information available at the time of the patient’s examination, I certify that the medical benefits reasonably to be expected from the provision of appropriate medical treatments at another medical facility outweigh the increasing risks, if any, to the individual’s medical condition, and in the case of labor to the unborn child, from effecting the transfer      X____________________________________________ DATE 05/16/23        TIME_______      ORIGINAL - SEND TO MEDICAL RECORDS   COPY - SEND WITH PATIENT DURING TRANSFER

## 2023-05-16 NOTE — DISCHARGE INSTRUCTIONS
Topical antibiotic ointment daily  Suture removal 10 days at PCP No pertinent past medical history <<----- Click to add NO pertinent Past Medical History

## 2023-05-16 NOTE — ED NOTES
Insurance Authorization for admission:   Phone call placed to [facility]  Phone number: [phone]    Spoke to Triny     ** days approved  Level of care: inpatient mental health  Review on 5/19/23  Authorization #   **    EVS (Eligibility Verification System) called - [phone]    Automated system indicates: active

## 2023-05-17 PROBLEM — F33.9 RECURRENT MAJOR DEPRESSIVE DISORDER (HCC): Status: ACTIVE | Noted: 2023-05-17

## 2023-05-17 PROBLEM — Z00.8 MEDICAL CLEARANCE FOR PSYCHIATRIC ADMISSION: Status: ACTIVE | Noted: 2023-05-17

## 2023-05-17 PROBLEM — F90.9 ADHD: Status: ACTIVE | Noted: 2023-05-17

## 2023-05-17 RX ORDER — DEXTROAMPHETAMINE SACCHARATE, AMPHETAMINE ASPARTATE, DEXTROAMPHETAMINE SULFATE AND AMPHETAMINE SULFATE 5; 5; 5; 5 MG/1; MG/1; MG/1; MG/1
40 TABLET ORAL DAILY
Status: DISCONTINUED | OUTPATIENT
Start: 2023-05-17 | End: 2023-05-19 | Stop reason: HOSPADM

## 2023-05-17 RX ORDER — NICOTINE 21 MG/24HR
1 PATCH, TRANSDERMAL 24 HOURS TRANSDERMAL DAILY
Status: DISCONTINUED | OUTPATIENT
Start: 2023-05-17 | End: 2023-05-19 | Stop reason: HOSPADM

## 2023-05-17 RX ADMIN — SERTRALINE HYDROCHLORIDE 150 MG: 100 TABLET ORAL at 12:38

## 2023-05-17 RX ADMIN — HYDROXYZINE HYDROCHLORIDE 100 MG: 50 TABLET, FILM COATED ORAL at 18:15

## 2023-05-17 RX ADMIN — NICOTINE POLACRILEX 2 MG: 2 GUM, CHEWING BUCCAL at 14:49

## 2023-05-17 RX ADMIN — ACETAMINOPHEN 650 MG: 325 TABLET, FILM COATED ORAL at 09:38

## 2023-05-17 RX ADMIN — DEXTROAMPHETAMINE SACCHARATE, AMPHETAMINE ASPARTATE, DEXTROAMPHETAMINE SULFATE AND AMPHETAMINE SULFATE 40 MG: 5; 5; 5; 5 TABLET ORAL at 12:38

## 2023-05-17 RX ADMIN — NICOTINE 1 PATCH: 21 PATCH, EXTENDED RELEASE TRANSDERMAL at 16:22

## 2023-05-17 NOTE — CMS CERTIFICATION NOTE
Recertification: Based upon physical, mental and social evaluations, I certify that inpatient psychiatric services continue to be medically necessary for this patient for a duration of 7 midnights for the treatment of  Recurrent major depressive disorder ([facility] [address] )   Available alternative community resources still do not meet the patient's mental health care needs  I further attest that an established written individualized plan of care has been updated and is outlined in the patient's medical records

## 2023-05-17 NOTE — NURSING NOTE
Pt withdrawn to room  OOB for meals  Scant and guarded  Napping during the day/evening  Cooperative with medication  Pt asking about discharge  Educated the 67 hour expires on Friday  Denies SI/HI/AH/VH  Denies any question or concern at this time

## 2023-05-17 NOTE — TREATMENT PLAN
TREATMENT PLAN REVIEW - [facility] Tegan Plata 37 y o  1980 male MRN: [id number]    [address] [place] [address] Room / Bed: [facility] 205/[facility] [address] Encounter: [id number]          Admit Date/Time:  5/16/2023  4:33 PM    Treatment Team: Attending Provider: Nicki Huggins MD; Care Manager: Nik Jimenez, RN; Registered Nurse: Eileen Guzman, RN; Registered Nurse: Bere Spangler, RN; Registered Nurse: Orlando Balbuena, JENNIFER; : Liseth Petersen; Patient Care Assistant: Virginia Mendiola; Patient Care Assistant: Alison Doss;  Patient Care Assistant: Leti Gerber    Diagnosis: Principal Problem:    Recurrent major depressive disorder [facility]  Active Problems:    ADHD      Patient Strengths/Assets: cooperative, motivation for treatment/growth, patient is on a voluntary commitment    Patient Barriers/Limitations: difficulty adapting, financial instability, homeless, lack of stable employment    Short Term Goals: decrease in depressive symptoms, decrease in anxiety symptoms, decrease in suicidal thoughts, decrease in self abusive behaviors, improvement in insight, improvement in self care, sleep improvement, improvement in appetite    Long Term Goals: improvement in depression, improvement in anxiety, resolution of depressive symptoms, stabilization of mood, free of suicidal thoughts, no self abusive behavior, improved insight, acceptance of need for psychiatric medications, acceptance of need for psychiatric treatment, adequate self care, adequate sleep, adequate appetite    Progress Towards Goals: starting psychiatric medications as prescribed    Recommended Treatment: medication management, patient medication education, group therapy, milieu therapy, continued Behavioral Health psychiatric evaluation/assessment process    Treatment Frequency: daily medication monitoring, group and milieu therapy daily, monitoring through interdisciplinary rounds, monitoring through weekly patient care conferences    Expected Discharge Date:  5-7 days    Discharge Plan: referral for outpatient medication management with a psychiatrist, referral for outpatient psychotherapy    Treatment Plan Created/Updated By: Kavita Pugh MD

## 2023-05-17 NOTE — ASSESSMENT & PLAN NOTE
• ALL labs pending  • Vitals stable   • UDS positive for amphetamines-patient on Adderall  • COVID-19 negative  • No ECG for review  • Medically stable for continued inpatient psychiatric treatment based on available results  • Please contact [facility]IM with any questions or concerns

## 2023-05-17 NOTE — NURSING NOTE
"Patient resting in bed under the covers upon approach, was cooperative with moving blanket down so writer could see pt face and assess LFA wound  Stitches are intact, area surrounding is pink, no drainage noted  Pt reports 5/10 pain and requested PRN Tylenol, medication was given at 0938  RN asked how pt was doing mentally, he reported \"i'm fine, i'm not suicidal, I just want to go home, I don't want to be here  \" RN validated this, and encouraged pt to try and be compliant with treatment    "

## 2023-05-17 NOTE — CONSULTS
[address]  Consult  Name: General Willett 37 y o  male I MRN: [id number]  Unit/Bed#: -01 I Date of Admission: 5/16/2023   Date of Service: 5/17/2023 I Hospital Day: 1    Inpatient consult for Medical Clearance for [facility] patient  Consult performed by: Primo Crespo PA-C  Consult ordered by: Nayla Judge PA-C          Assessment/Plan   * Medical clearance for psychiatric admission  Assessment & Plan  • ALL labs pending  • Vitals stable   • UDS positive for amphetamines-patient on Adderall  • COVID-19 negative  • No ECG for review  • Medically stable for continued inpatient psychiatric treatment based on available results  • Please contact [facility] with any questions or concerns      ADHD  Assessment & Plan  On Adderall  UDS (+) for amphetamines as a result    Recurrent major depressive disorder [facility])  Assessment & Plan  Management by psych         Recommendations for Discharge:  · [facility] will continue to be available for any questions or concerns  · Follow up with PCP upon discharge  Counseling / Coordination of Care Time: 30 minutes  Greater than 50% of total time spent on patient counseling and coordination of care  Collaboration of Care: Were Recommendations Directly Discussed with Primary Treatment Team? - Yes     History of Present Illness: General Willett is a 37 y o  male who is originally admitted to the psychiatric service due to attempted suicide by cutting self on left wrist, requiring 3 stitches in ED  This was a result due to the patient losing his job and now being homeless we are consulted for medical clearance for psychiatric hospitalization and medical management  Patient carries a diagnosis of ADHD for which he takes Adderall, UDS positive for methamphetamine/amphetamines  Otherwise, denies any other past medical history  Currently denies any chest pain, shortness of breath, lightness, nausea, vomiting, abdominal pain    Denies any recent travel or "recent sick contacts  No labs or ECG for review  Based on current information available, medically stable for psychiatric admission  Review of Systems:    Review of Systems   Constitutional: Negative for appetite change, chills, fatigue and fever  HENT: Negative for ear pain, sore throat and trouble swallowing  Eyes: Negative for visual disturbance  Respiratory: Negative for cough, chest tightness, shortness of breath and wheezing  Cardiovascular: Negative for chest pain, palpitations and leg swelling  Gastrointestinal: Negative for abdominal distention, abdominal pain, diarrhea, nausea and vomiting  Endocrine: Negative  Genitourinary: Negative for dysuria, flank pain and hematuria  Musculoskeletal: Negative for arthralgias, gait problem and myalgias  Skin: Negative for pallor  Allergic/Immunologic: Negative for immunocompromised state  Neurological: Negative for dizziness, syncope, light-headedness, numbness and headaches  Past Medical and Surgical History:     History reviewed  No pertinent past medical history  No past surgical history on file      Meds/Allergies:    all medications and allergies reviewed    Allergies: No Known Allergies    Social History:     Marital Status: Single    Substance Use History:   Social History     Substance and Sexual Activity   Alcohol Use Yes   • Alcohol/week: 21 0 standard drinks   • Types: 21 Cans of beer per week    Comment: 23- beers daily     Social History     Tobacco Use   Smoking Status Every Day   • Packs/day: 1 00   • Types: Cigarettes   Smokeless Tobacco Never     Social History     Substance and Sexual Activity   Drug Use Never       Family History:    non-contributory    Physical Exam:     Vitals:   Blood Pressure: 154/84 (05/17/23 1621)  Pulse: 63 (05/17/23 1621)  Temperature: 98 5 °F (36 9 °C) (05/17/23 1621)  Temp Source: Tympanic (05/17/23 1621)  Respirations: 17 (05/17/23 1621)  Height: 5' 11\" (180 3 cm) (05/16/23 " 1635)  Weight - Scale: 88 5 kg (195 lb 0 3 oz) (05/16/23 1635)  SpO2: 96 % (05/17/23 1621)    Physical Exam  Constitutional:       General: He is not in acute distress  HENT:      Head: Normocephalic and atraumatic  Eyes:      Extraocular Movements: Extraocular movements intact  Pupils: Pupils are equal, round, and reactive to light  Cardiovascular:      Rate and Rhythm: Normal rate and regular rhythm  Pulses: Normal pulses  Heart sounds: Normal heart sounds  No murmur heard  No friction rub  No gallop  Pulmonary:      Effort: Pulmonary effort is normal  No respiratory distress  Breath sounds: Normal breath sounds  Abdominal:      General: Abdomen is flat  There is no distension  Palpations: Abdomen is soft  Tenderness: There is no abdominal tenderness  Musculoskeletal:         General: Normal range of motion  Cervical back: Normal range of motion  Skin:     General: Skin is warm and dry  Neurological:      General: No focal deficit present  Mental Status: He is alert  Comments: CN II-XII intact  Ambulated without difficulty/ataxia           Additional Data:     Lab Results:                     No results found for: HGBA1C            Imaging: I have personally reviewed pertinent reports  No orders to display       EKG, Pathology, and Other Studies Reviewed on Admission:   · EKG: see above    ** Please Note: This note has been constructed using a voice recognition system   **

## 2023-05-17 NOTE — H&P
"Psychiatric Evaluation - [address] 37 y o  male MRN: [id number]  Unit/Bed#: -01 Encounter: [id number]    Assessment/Plan   Principal Problem:    Recurrent major depressive disorder ([address] )  Active Problems:    ADHD    Plan:   Continue Sertraline 150 mg daily  Continue Adderall 40 mg daily  Admit to [address] on [address] status for safety and treatment  No 1:1 continuous observation needed at this time, as patient feels safe on the unit  Check admission labs  Get collaterals  Collaborate with family for baseline assessment and disposition planning  Case discussed with treatment team     Treatment options and alternatives were reviewed with the patient, who concurs with the above plan  Risks, benefits, and possible side effects of medications were explained to the patient, and he verbalizes understanding       -----------------------------------    Chief Complaint: \"I lost job and didn't handle well\"    History of Present Illness     Per Crisis worker on 5/16:\"Pt met with clinical coordinator ([address] [place] [address]) in room with door closed  Pt attempted suicide by cutting self on left wrist that needed three stitches to close  Girlfriend called 911  Pt reported that he attempted due to losing job as a  and that he is currently homeless  Pt reported inpatient history at [facility] in past but does not currently have a mental health provider  Pt reported no legal or substance use  Pt reported smoking half of a pack of cigarettes a day  Pt denies homicidal ideation and denies symptoms of psychosis  Pt was informed of 201 vs 302 treatment options  Pt appeared frustrated and irritated  Pt was informed that per [facility] hospital staff can not release information to outside entities without consent  Pt given time to think over options  \"    This is 36 yo male with hx of depression/ADHD admitted to inpatient unit on voluntary status for worsening of mood, suicidal ideations and cutting wrist in " the context of psychosocial stressors  Patient reports losing job recently and homelessness are current stressors  Reports impulsively stabbing in hand and informed his girl friend, who called  to check on him  Patient endorses depressed mood, anhedonia, low energy, lack of motivation and low self esteem  Denies any thoughts to hurt self currently  Denies any symptoms of razia or psychosis  Psychiatric Review Of Systems:  Medication side effects: none  Sleep: no change  Appetite: no change  Hygiene: able to tend to instrumental and basic ADLs  Anxiety: Yes  Psychotic Symptoms: denies  Depression Symptoms: Yes  Manic Symptoms: denies  PTSD Symptoms: denies  Suicidal Thoughts: denies  Homicidal Thoughts: denies    Medical Review Of Systems:   Complete ROS is negative, except as noted above  05/01/2023 05/01/2023   1  Dextroamp-Amphetamin 20 Mg Tab 60 00  30  Ri Kunal  [id number]   Wal ([id number])    [place] Ins  [place]     04/01/2023 12/01/2022   1  Dextroamp-Amphetamin 20 Mg Tab 60 00  30  Ri Kunal  [id number]   Hap ([id number])    [place] Ins  [place]     03/03/2023 03/03/2023   1  Methylphenidate 20 Mg Tablet 60 00  30  Ri Kunal  [id number]   Hap ([id number])    [place] Ins  [place]     02/01/2023 02/01/2023   1  Methylphenidate 20 Mg Tablet 60 00  30  Ri Kunal  [id number]   Hap ([id number])    [place]     01/03/2023 01/03/2023   2  Methylphenidate 20 Mg Tablet 60 00  30  Ri Kunal  [id number]   Hap ([id number])    [place] Ins  [place]          Historical Information     Psychiatric History:   Psychiatry diagnosis:depression/ADHD  Inpatient Hx: Yes once at [place] for suicidal ideations, a year ago  Suicidal Hx:Denies  Self harming behavior Hx:Denies  Violent behavior Hx:Denies  Outpatient Hx: PCP  Medications/Trials: Zoloft and Adderaal from 5years old    Substance Abuse History:  Occassional alcohol use  UDS +meth/amphetamines  I spent time with Juli Paez in counseling and education on risk of substance abuse  I assessed motivation and encouraged him for treatment as appropriate  Family Psychiatric History:   Patient denies any known family history of psychiatric illness, suicide attempt, or substance abuse    Social History:  Education: HS  Learning Disabilities:Denies  Marital history: Single  Living arrangement: Homeless  Occupational History: unemployed  Functioning Relationships: Limited  Other Pertinent History:    Hx: Denies  Legal Hx: Denies    Traumatic History:   Denies    Past Medical History:  History of Seizures: no  History of Head injury with loss of consciousness: no    Past Medical History:   History reviewed  No pertinent past medical history  -----------------------------------  Objective    Temp:  [97 3 °F (36 3 °C)-98 8 °F (37 1 °C)] 97 3 °F (36 3 °C)  HR:  [65-87] 83  Resp:  [16-18] 17  BP: (118-141)/(74-96) 118/75    Mental Status Evaluation:    Appearance:  age appropriate   Behavior:  superficial   Speech:  soft   Mood:  anxious and depressed   Affect:  constricted   Thought Process:  goal directed and logical   Thought Content:  normal   Perceptual Disturbances: None   Risk Potential: Suicidal Ideations none  Homicidal Ideations none  Potential for Aggression No   Sensorium:  person, place and time/date   Memory:  recent and remote memory grossly intact   Consciousness:  alert and awake    Attention: attention span appeared shorter than expected for age   Insight:  limited   Judgment: limited   Gait/Station: normal gait/station   Motor Activity: no abnormal movements       Meds/Allergies   No Known Allergies  all current active meds have been reviewed    Behavioral Health Medications: all current active meds have been reviewed  Changes as above  Laboratory results:  I have personally reviewed all pertinent laboratory/tests results    Recent Results (from the past 48 hour(s))   POCT alcohol breath test    Collection Time: 05/16/23 11:41 AM   Result Value Ref Range    EXTBreath Alcohol 0 000    COVID only    Collection Time: 05/16/23 11:41 AM Specimen: Nose; Nares   Result Value Ref Range    SARS-CoV-2 Negative Negative   Rapid drug screen, urine    Collection Time: 05/16/23 11:49 AM   Result Value Ref Range    Amph/Meth UR Positive (A) Negative    Barbiturate Ur Negative Negative    Benzodiazepine Urine Negative Negative    Cocaine Urine Negative Negative    Methadone Urine Negative Negative    Opiate Urine Negative Negative    PCP Ur Negative Negative    THC Urine Negative Negative    Oxycodone Urine Negative Negative              -----------------------------------    Risks / Benefits of Treatment:     Risks, benefits, and possible side effects of medications explained to patient  The patient verbalizes understanding and agreement for treatment  Counseling / Coordination of Care:     Patient's presentation on admission and proposed treatment plan were discussed with the treatment team   Diagnosis, medication changes and treatment plan were reviewed with the patient  Recent stressors were discussed with the patient  Events leading to admission were reviewed with the patient  Importance of medication and treatment compliance was reviewed with the patient            Inpatient Psychiatric Certification:     Certification: Based upon physical, mental and social evaluations, I certify that inpatient psychiatric services are medically necessary for this patient for a duration of 5 midnights for the treatment of Recurrent major depressive disorder ([facility] [address] )

## 2023-05-18 RX ADMIN — TRAZODONE HYDROCHLORIDE 50 MG: 50 TABLET ORAL at 21:25

## 2023-05-18 RX ADMIN — SERTRALINE HYDROCHLORIDE 150 MG: 100 TABLET ORAL at 09:00

## 2023-05-18 RX ADMIN — POLYETHYLENE GLYCOL 3350 17 G: 17 POWDER, FOR SOLUTION ORAL at 10:44

## 2023-05-18 RX ADMIN — DEXTROAMPHETAMINE SACCHARATE, AMPHETAMINE ASPARTATE, DEXTROAMPHETAMINE SULFATE AND AMPHETAMINE SULFATE 40 MG: 5; 5; 5; 5 TABLET ORAL at 09:00

## 2023-05-18 RX ADMIN — NICOTINE 1 PATCH: 21 PATCH, EXTENDED RELEASE TRANSDERMAL at 09:01

## 2023-05-18 RX ADMIN — NICOTINE POLACRILEX 2 MG: 2 GUM, CHEWING BUCCAL at 15:01

## 2023-05-18 RX ADMIN — SENNOSIDES AND DOCUSATE SODIUM 1 TABLET: 8.6; 5 TABLET ORAL at 21:25

## 2023-05-18 RX ADMIN — HYDROXYZINE HYDROCHLORIDE 100 MG: 50 TABLET, FILM COATED ORAL at 17:29

## 2023-05-18 NOTE — NURSING NOTE
Patient requested and received Atarax 100 MG PO PRN for anxiety  Smith score: 29  Patient sleeping upon reassessment

## 2023-05-18 NOTE — DISCHARGE INSTR - OTHER ORDERS
[facility]  If you or someone you know is in crisis, please call [phone]    [facility] Suicide Prevention Hotline:  3-896.362.9418  [facility] Crisis:  [facility] Crisis:  [facility] Crisis:  [phone]  [place] Drug & Alcohol Helpline:  7-943.389.1054       Crisis Text Line is free, 24/7 support for those in crisis  Text HOME to 825272 from anywhere in the [place] to text with a trained Crisis Counselor  Peer Recovery Warmline Phone# [phone] through [facility], hours of operation are 1:00 PM to 5:00 PM, Monday through Friday, except major holidays  [facility] of [address] [place] [address] [phone] · [address], [place], [facility]  Provides free, anonymous and confidential telephone helpline services to help with severe mental health issues to financial concerns, family issues or simply a desire to be heard    [facility] Crisis Intervention Services:  [phone]  KAREN Helpline:  5-778.933.1049,  9:00am - 9:00pm; text KAREN to 159568 (24/7)  [facility] ([facility]):  [phone]  Suicide Prevention Lifeline:  8-549-034-TALK (7813) [address]:  2-328.675.4145

## 2023-05-18 NOTE — PROGRESS NOTES
Diagnosis of Recurrent major depressive disorder reviewed  Short term goals for decrease in depressive symptoms, decrease in anxiety symptoms, decrease in suicidal thoughts, decrease in self abusive behaviors, improvement in insight, improvement in self care, sleep improvement, improvement in appetite discussed  All present parties in agreement and treatment plan signed      05/18/23 5052   Team Meeting   Meeting Type Tx Team Meeting   Team Members Present   Team Members Present Physician;Nurse;   Physician Team Member Dr Nanda Plata Team Member [facility] Management Team Member Celso   Patient/Family Present   Patient Present No  (pt declined to meet with treatment team)   Patient's Family Present No

## 2023-05-18 NOTE — DISCHARGE INSTR - APPOINTMENTS
You will be discharged to [facility], [address] per your request    Raeann Llanos confirmed that your cell phone number is: [phone]          Maddie Willingham or Leslie, carie Hensley and Stanley, will be calling you after your discharge, on the phone number that you provided  They will be available as an additional support, if needed  If you wish to speak with one of them, you may contact Antonia Olsen at [phone] or Ana Gupta at [phone]

## 2023-05-18 NOTE — NURSING NOTE
"Pt remains pleasant in conversation, states he slept well overnight  Denies SI/HI, AVH  Pt reports having an improvement with mood since admission  Pt states he struggles with \"impulsive\" thoughts/actions and was feeling overwhelmed at work which is what led to self harm  Pt states he doesn't feel he can speak to his family about stressors and has a girlfriend however they struggle with communications, \"she doesn't want to hear my problems, she has her own she says  \" discussed the importance of utilizing crisis services if struggling with unsafe thoughts and needing to speak to somebody  Pt expressed appreciation of this and states he feels this would be helpful  Pt reports not having BM in 2 days and requested prn medication for this  Remains calm and cooperative     "

## 2023-05-18 NOTE — CASE MANAGEMENT
"INTAKE   Pt scant and guarded in conversation, but polite  Pt was in bed resting  Pt reported \"is there any way I can get out of here before tomorrow, I have a lot of stuff to do  \" CM informed him that his dc date is tomorrow  Pt signed 72 hour notice on 5/16/23 @ 02 73 91 27 04  Readmit score:  16 Yellow   Confirmed Address   [address] [place] [address] (previous address)     Pt reported he has been renting hotel room at [facility] in [facility] and that is where his car is  ([address] [address])   [place]: Veronika Florentino   Resides in the home with/can return?:    Pt reported he will return to above hotel where his car is, he plans to stay there for a bit and then plans to stay with a friend in [place]  Confirmed Phone Number: [phone]    Commitment Status/Admitted from:   [place]  ED   Presenting C/O:             ED Note   \"  Psychiatric Evaluation        Pt was found in hotel, gf called the \[facility]\"" for welfare check  Pt lost job this morning  Pt attempted to kill himself with cutting left wrist with pocket knife, pills were scattered across blood  Pt states \"I just dont give a fuck anymore and as soon as I get out of here ill do it again with whatever comes along\"  on scene and removed pocket knife  Pt admits to drinking 1 beer, 2 adderall and 1 Zoloft pt with poor eye contact        Patient is a 45-year-old white male with history of ADHD who reports he lost his job as a  this morning  He spoke to his girlfriend from the hotel where he was staying  She called police to do a welfare check on patient  Patient reports stabbing in his left wrist with a pocket knife  Denies HI  Unsure of his last tetanus shot  States he drank 1 beer this morning  Denies illicit drug use  Patient reports \"soon as I get out of here I will do it again\"  \"     Outpatient:    Pt reported he has been seeing a doctor \"for a while  \" who prescribes his Adderall and Zoloft and that he " pays privately because his insurance was giving him issues in the past with not covering his medications and providers  Pt would not identify the name of his provider but reported that he knows how to contact them for his next appointment  ACT/ICM/CPS/WRT/SC: N/A   PCP:    N/A   Work/Income:      Pt is currently unemployed as he reported he lost his job as a  on 5/16/23     Legal/  Probation/[place]:    Denies   Access to Firearms:    Denies   Referrals Needed: None - pt declined referrals and reported he will contact his provider for follow up medication management appt  Transport at Discharge:    Pt will need transportation  Pt requesting to go to [facility] where his car is, pt reported he will stay there for a bit then stay with a friend in [place]        IMM:   [place] Text MAGGI: N/A   Emergency Contact:     None    ROIs obtained:       None - pt declined MAGGI for family/friends/referrals/provider   Insurance:     [facility]   Audit:        PAWSS:  BAT:  UDS: + for Amph (pt prescribed Adderall)

## 2023-05-18 NOTE — CASE MANAGEMENT
CM sent transportation request to [address] [place] [address] transport for pt dc on Friday 5/19/23  Pt requesting to go to [facility] / Rhina Walker  Pt reported his car is there and then he plans to stay at the hotel for a few nights and then plans to stay with his friend in [address], [address]     Transportation scheduled for 5/19/23 at 10:30am

## 2023-05-18 NOTE — NURSING NOTE
"Pt reported severe anxiety 4/4, myers anxiety score 26  Pt was given 100 mg Atarax  Upon reassessment Pt stated, \"it helped somewhat  \"  "

## 2023-05-18 NOTE — PROGRESS NOTES
New admission - 201 from [facility] ED  Brought in by Police, pt cut wrist with a pocket knife  Pt recently lost job as  and is homeless  Pt irritable and demanding  Pt UDS + for Meth  DC: Friday (pt signed 72 hour notice on 5/16/23)     05/18/23 0918   Team Meeting   Meeting Type Daily Rounds   Team Members Present   Team Members Present Physician;Nurse;; Other (Discipline and Name)   Physician Team Member Dr Jamie Kwong / Radha General Team Member General Harmon / Jeff Liner Management Team Member Juani Cardoso / Fartun Pavon   Other (Discipline and Name) Herlinda Lan - Art Therapy   Patient/Family Present   Patient Present No   Patient's Family Present No

## 2023-05-18 NOTE — PROGRESS NOTES
"Progress Note - Behavioral Health   Rita Delgadillo 37 y o  male MRN: [id number]  Unit/Bed#: U 205-01 Encounter: [id number]    Patient was seen today for continuation of care, records reviewed and patient was discussed with the morning case review team   Per staff, Maciej Epperson continues to refuse labs  He is withdrawn and scant in conversation  72 hour notice expiring tomorrow  Denying SI/HI  Maciej Epperson was seen today for psychiatric follow-up  On assessment today, Maciej Epperson was seen in his room  Maciej Epperson is calm, pleasant and cooperative with the interview  Maciej Epperson admits to impulsive decision to cut arm but states today that he was not intending to end his life  He reports history of losing jobs and notes many regrets over the past 20 years  He does endorse some continued depression but states that he has no SI or HI  He denies ever having SA in the past   He reports having good supports including friends and his current girlfriend that he has known since high school  He plans to stay with friends after discharge and feels he can get a job back with a [employer] he worked for a few months ago  He hopes to eventually save enough money to find an apartment  He is future thinking and goal oriented  I discussed changing anti depressants and he declines at this time  He states that he was in a hospital last year and his medications were changed and \"it didn't work out well, my medications were not covered\"  He states he will consider a medication change with his outpatient provider that he has been seeing for many years  Maciej Epperson continues to present as depressed but is hopeful and future thinking  Maciej Epperson does have some insight into his behaviors and is hopeful for change  He does report some improvement in sleep and mood since admission  Maciej Epperson adamantly denies acute suicidal/self-harm ideation/intent/plan upon direct inquiry at this timee    Maciej Epperson remains behaviorally controlled, no agitation or " aggression noted on exam or in report  Winsome Duran denies HI/AH/VH, and does not appear overtly manic or internally preoccupied  No overt delusions or paranoia are verbalized  Winsome Duran remains adherent to his current psychotropic medication regimen and denies any side effects from medications, as well as none noted on exam     Continue current medications, 72 hour notice expiring tomorrow  Winsome Duran does not appear to be a threat to self or others at this time  Potential for discharge tomorrow if stable  Vitals:  Vitals:    05/18/23 0757   BP: 135/77   Pulse: 67   Resp: 18   Temp: 97 7 °F (36 5 °C)   SpO2: 98%       Laboratory Results:  I have personally reviewed all pertinent laboratory/tests results  Most Recent Labs: No results found for: WBC, RBC, HGB, HCT, PLT, RDW, TOTANEUTABS, NEUTROABS, SODIUM, K, CL, CO2, BUN, CREATININE, GLUC, GLUF, CALCIUM, AST, ALT, ALKPHOS, TP, ALB, TBILI, CHOLESTEROL, HDL, TRIG, LDLCALC, NONHDLC, VALPROICTOT, CARBAMAZEPIN, LITHIUM, AMMONIA, [id number], FREET4, T3FREE, PREGUR, PREGSERUM, HCG, HCGQUANT, RPR, HGBA1C, EAG    Psychiatric Review of Systems:  Behavior over the last 24 hours:  improved     Sleep: improving  Appetite: fair  Medication side effects: denies  ROS: no complaints, denies shortness of breath or chest pain and all other systems are negative for acute changes    Mental Status Evaluation:    Appearance:  adequate grooming, wearing hospital clothes   Behavior:  pleasant, cooperative, calm   Speech:  normal rate and volume   Mood:  slightly less depressed   Affect:  constricted   Thought Process:  organized, logical, goal directed, linear   Associations: intact associations   Thought Content:  normal   Perceptual Disturbances: none   Risk Potential: Suicidal ideation - None  Homicidal ideation - None  Potential for aggression - No   Sensorium:  oriented to person, place, time/date and situation   Memory:  recent and remote memory grossly intact   Consciousness:  alert and awake   Attention/Concentration: attention span and concentration are age appropriate   Insight:  improving   Judgment: improving   Gait/Station: normal gait/station   Motor Activity: no abnormal movements     Progress Toward Goals: Julia Painter is progressing towards goals of inpatient psychiatric treatment by continued medication compliance and is attending therapeutic modalities on the milieu  However, the patient continues to require inpatient psychiatric hospitalization for continued medication management and titration to optimize symptom reduction, improve sleep hygiene, and demonstrate adequate self-care  Risk of Harm to Self:   Nursing Suicide Risk Assessment Last 24 hours: C-SSRS Risk (Since Last Contact)  Calculated C-SSRS Risk Score (Since Last Contact): No Risk Indicated    Risk of Harm to Others:  Nursing Homicide Risk Assessment: Violence Risk to Others: Denies within past 6 months    The following interventions are recommended: behavioral checks every 7 minutes, continued hospitalization on locked unit      Assessment/Plan   Principal Problem:    Medical clearance for psychiatric admission  Active Problems:    Recurrent major depressive disorder ([facility] [address] )    ADHD      Recommended Treatment: Treatment plan and medication changes discussed and per the attending physician the plan is: 1  Continue with group therapy, milieu therapy and occupational therapy  2  Behavioral Health checks every 7 minutes  3  Continue frequent safety checks and vitals per unit protocol  4  Continue with SLIM medical management as indicated  5  Continue with current medication regimen  6  Will review labs in the a m  7 Disposition Planning: Discharge planning and efforts remain ongoing    Behavioral Health Medications: all current active meds have been reviewed and continue current psychiatric medications    Current Facility-Administered Medications   Medication Dose Route Frequency Provider Last Rate   • acetaminophen  650 mg Oral Q6H PRN Dustin Fletcher PA-C     • acetaminophen  650 mg Oral Q4H [facility]     • acetaminophen  975 mg Oral Q6H PRN Lindsayrefugio Murray PA-C     • aluminum-magnesium hydroxide-simethicone  30 mL Oral Q4H PRN Dustin Fletcher PA-C     • amphetamine-dextroamphetamine  40 mg Oral Daily Lisy Franco MD     • bacitracin  1 small application Topical Daily PRN Dustin Fletcher PA-C     • haloperidol lactate  2 5 mg Intramuscular Q6H PRN Max 4/day [place], [place]      And   • LORazepam  1 mg Intramuscular Q6H PRN Max 4/day [facility] SUMA Shipman      And   • benztropine  0 5 mg Intramuscular Q6H PRN [facility] 4[facility]     • haloperidol lactate  5 mg Intramuscular Q4H PRN Max 4/day [facility] SUMA Shipman      And   • LORazepam  2 mg Intramuscular Q4H PRN Max 4/day [facility] SUMA Shipman      And   • benztropine  1 mg Intramuscular Q4H PRN Max 4/day [facility] SUMA Shipman     • benztropine  1 mg Intramuscular Q4H PRN Max 6/day [facility]     • benztropine  1 mg Oral Q4H PRN Max 6/[facility]     • bisacodyl  10 mg Rectal Daily PRN Lisy Franco MD     • hydrOXYzine HCL  50 mg Oral Q6H PRN Max 4/day [facility] SU[facility] Ship[facility]      Or   • diphenhydrAMINE  50 mg Intramuscular Q6H PRN Dustin Fletcher PA-C     • glycerin-hypromellose-  1 drop Both Eyes Q3H PRN Dustin Fletcher PA-C     • haloperidol  2 mg Oral Q4H PRN Max 6/day Dustin Fletcher PA-C     • haloperidol  5 mg Oral Q6H PRN Max 4/day [facility] SU[facility]     • haloperidol  5 mg Oral Q4H PRN Max 4/day [facility] SUMA Shipman     • hydrOXYzine HCL  100 mg Oral Q6H PRN Max 4/day Estela Ship[place] PA-C      Or   • LORazepam  2 mg Intramuscular Q6H PRN Dustin Fletcher PA-C     • hydrOXYzine HCL  25 mg Oral Q6H PRN Max 4/day Estela Shipman PA-C     • nicotine  1 patch Transdermal Daily Wash Lesch Cesilia Armenta MD     • nicotine polacrilex  2 mg Oral Q2H PRN Ryan Shipman PA-C     • polyethylene glycol  17 g Oral Daily PRN Kodi Monge MD     • propranolol  10 mg Oral Q8H PRN Travis Montiel PA-C     • senna-docusate sodium  1 tablet Oral Daily PRN Travis Montiel PA-C     • sertraline  150 mg Oral Daily Kodi Monge MD     • traZODone  50 mg Oral HS PRN Travis Montiel PA-C         Risks / Benefits of Treatment:  Risks, benefits, and possible side effects of medications explained to patient and patient verbalizes understanding and agreement for treatment  Counseling / Coordination of Care:  Patient's progress reviewed with nursing staff  Medications, treatment progress and treatment plan reviewed with patient  Supportive counseling provided to the patient  Total floor/unit time spent today 25 minutes  Greater than 50% of total time was spent with the patient and / or family counseling and / or coordination of care  A description of the counseling / coordination of care: medication education, treatment plan, supportive therapy

## 2023-05-19 VITALS
OXYGEN SATURATION: 96 % | BODY MASS INDEX: 27.3 KG/M2 | HEART RATE: 70 BPM | RESPIRATION RATE: 18 BRPM | TEMPERATURE: 97.1 F | HEIGHT: 71 IN | WEIGHT: 195.02 LBS | SYSTOLIC BLOOD PRESSURE: 140 MMHG | DIASTOLIC BLOOD PRESSURE: 85 MMHG

## 2023-05-19 RX ADMIN — NICOTINE POLACRILEX 2 MG: 2 GUM, CHEWING BUCCAL at 08:51

## 2023-05-19 RX ADMIN — SERTRALINE HYDROCHLORIDE 150 MG: 100 TABLET ORAL at 08:50

## 2023-05-19 RX ADMIN — DEXTROAMPHETAMINE SACCHARATE, AMPHETAMINE ASPARTATE, DEXTROAMPHETAMINE SULFATE AND AMPHETAMINE SULFATE 40 MG: 5; 5; 5; 5 TABLET ORAL at 08:50

## 2023-05-19 NOTE — PROGRESS NOTES
Pt refused labs, scant in conversation, withdrawn to room  Slept overnight  Took Atarax  DC: Friday - pt signed 72 hour notice on 5/16/23 @ [phone]     05/18/23 0840   Team Meeting   Meeting Type Daily Rounds   Team Members Present   Team Members Present Physician;Nurse;; Other (Discipline and Name)   Physician Team Member Dr Kim Hawthorne / Lia Sellers Team Member Stacey Munoz / Yolis Bath Management Team Member Ross Mariscal / Michell Tripp   Other (Discipline and Name) Aram Iglesias - Art Therapy   Patient/Family Present   Patient Present No   Patient's Family Present No

## 2023-05-19 NOTE — PROGRESS NOTES
Pt withdrawn to room, sleeping  Reported constipation and given Miralax  DC: Today Filippo Richey at [facility]       05/19/23 8673   Team Meeting   Meeting Type Daily Rounds   Team Members Present   Team Members Present Physician;Nurse;; Other (Discipline and Name)   Physician Team Member Dr José Manuel Holm / Devon Mishra Team Member Haleigh Whitfield / Chandler Santiago Management Team Member Deepika Belle / Vanita Alamo   Other (Discipline and Name) Elias Bell - Art Therapy   Patient/Family Present   Patient Present No   Patient's Family Present No

## 2023-05-19 NOTE — PLAN OF CARE
Problem: SELF HARM/SUICIDALITY  Goal: Will have no self-injury during hospital stay  Description: INTERVENTIONS:  - Q 15 MINUTES: Routine safety checks  - Q WAKING SHIFT & PRN: Assess risk to determine if routine checks are adequate to maintain patient safety  - Encourage patient to participate actively in care by formulating a plan to combat response to suicidal ideation, identify supports and resources  Outcome: Progressing     Problem: BEHAVIOR  Goal: Pt/Family maintain appropriate behavior and adhere to behavioral management agreement, if implemented  Description: INTERVENTIONS:  - Assess the family dynamic   - Encourage verbalization of thoughts and concerns in a socially appropriate manner  - Assess patient/family's coping skills and non-compliant behavior (including use of illegal substances)  - Utilize positive, consistent limit setting strategies supporting safety of patient, staff and others  - Initiate consult with Case Management, Spiritual Care or other ancillary services as appropriate  - If a patient's/visitor's behavior jeopardizes the safety of the patient, staff, or others, refer to organization procedure     - Notify Security of behavior or suspected illegal substances which indicate the need for search of the patient and/or belongings  - Encourage participation in the decision making process about a behavioral management agreement; implement if patient meets criteria  Outcome: Progressing     Problem: ANXIETY  Goal: Will report anxiety at manageable levels  Description: INTERVENTIONS:  - Administer medication as ordered  - Teach and encourage coping skills  - Provide emotional support  - Assess patient/family for anxiety and ability to cope  Outcome: Progressing  Goal: By discharge: Patient will verbalize 2 strategies to deal with anxiety  Description: Interventions:  - Identify any obvious source/trigger to anxiety  - Staff will assist patient in applying identified coping technique/skills  - Encourage attendance of scheduled groups and activities  Outcome: Progressing

## 2023-05-19 NOTE — BH TRANSITION RECORD
Contact Information: If you have any questions, concerns, pended studies, tests and/or procedures, or emergencies regarding your inpatient behavioral health visit  Please contact [address] [place] [address] behavioral health unit [phone] and ask to speak to a , nurse or physician  A contact is available 24 hours/ 7 days a week at this number  Summary of Procedures Performed During your Stay:  Below is a list of major procedures performed during your hospital stay and a summary of results:  - No major procedures performed  Pending Studies (From admission, onward)    None        Please follow up on the above pending studies with your PCP and/or referring provider

## 2023-05-19 NOTE — NURSING NOTE
AVS discharge instructions reviewed with patient  Patient denies any questions or concerns and expresses readiness for discharge  Patient discharged from the unit pleasant and calm

## 2023-05-19 NOTE — TREATMENT TEAM
05/19/23 0920   Activity/Group Checklist   Group Admission/Discharge   Attendance Attended   Attendance Duration (min) 0-15   Interactions Interacted appropriately   Affect/Mood Appropriate   Goals Achieved Identified relapse prevention strategies; Discussed coping strategies; Identified resources and support systems; Able to listen to others; Able to engage in interactions; Other (Comment)  (pt independently filled out the relapse prevention plan form with this therapist available for support if needed  once completed, pt had no questions or concerns   crisi #s highlighted on form and copy of form placed in DC folder )

## 2023-05-19 NOTE — NURSING NOTE
Pt remains pleasant and cooperative  Denies SI/HI, reports he slept well and expresses feeling readiness to discharge today  Pt reports feeling that his mood has improved  Discussed crisis resources and outpatient support to utilize if needed  Wound covered with bandage  Pt declined assessment  Denies concerns

## 2023-05-19 NOTE — NURSING NOTE
Patient withdrawn to room throughout the evening  Affect is appropriate  Denies SI/HI and depression  Continues to endorse anxiety throughout the evening  Remains hopeful for discharge  Maintained on Q 7 minute checks

## 2023-05-19 NOTE — NURSING NOTE
Patient appeared to sleep well  Observed resting in bed with eyes closed, respirations regular, even and non-labored, throughout the night  No signs of distress, arouses easily  Q7 minute rounds maintained throughout shift for patient safety

## 2023-05-19 NOTE — NURSING NOTE
Patient requested and received Trazodone 50 MG PO PRN for insomnia and Senokot 1 tablet PO PRN for constipation at 21:25    Reassessment at 22:25: Patient sleeping at this time

## 2023-05-19 NOTE — PLAN OF CARE
Problem: SELF HARM/SUICIDALITY  Goal: Will have no self-injury during hospital stay  Description: INTERVENTIONS:  - Q 15 MINUTES: Routine safety checks  - Q WAKING SHIFT & PRN: Assess risk to determine if routine checks are adequate to maintain patient safety  - Encourage patient to participate actively in care by formulating a plan to combat response to suicidal ideation, identify supports and resources  Outcome: Adequate for Discharge     Problem: BEHAVIOR  Goal: Pt/Family maintain appropriate behavior and adhere to behavioral management agreement, if implemented  Description: INTERVENTIONS:  - Assess the family dynamic   - Encourage verbalization of thoughts and concerns in a socially appropriate manner  - Assess patient/family's coping skills and non-compliant behavior (including use of illegal substances)  - Utilize positive, consistent limit setting strategies supporting safety of patient, staff and others  - Initiate consult with Case Management, Spiritual Care or other ancillary services as appropriate  - If a patient's/visitor's behavior jeopardizes the safety of the patient, staff, or others, refer to organization procedure     - Notify Security of behavior or suspected illegal substances which indicate the need for search of the patient and/or belongings  - Encourage participation in the decision making process about a behavioral management agreement; implement if patient meets criteria  Outcome: Adequate for Discharge     Problem: ANXIETY  Goal: Will report anxiety at manageable levels  Description: INTERVENTIONS:  - Administer medication as ordered  - Teach and encourage coping skills  - Provide emotional support  - Assess patient/family for anxiety and ability to cope  Outcome: Adequate for Discharge  Goal: By discharge: Patient will verbalize 2 strategies to deal with anxiety  Description: Interventions:  - Identify any obvious source/trigger to anxiety  - Staff will assist patient in applying identified coping technique/skills  - Encourage attendance of scheduled groups and activities  Outcome: Adequate for Discharge     Problem: SLEEP DISTURBANCE  Goal: Will exhibit normal sleeping pattern  Description: Interventions:  -  Assess the patients sleep pattern, noting recent changes  - Administer medication as ordered  - Decrease environmental stimuli, including noise, as appropriate during the night  - Encourage the patient to actively participate in unit groups and or exercise during the day to enhance ability to achieve adequate sleep at night  - Assess the patient, in the morning, encouraging a description of sleep experience  Outcome: Adequate for Discharge     Problem: SELF CARE DEFICIT  Goal: Return ADL status to a safe level of function  Description: INTERVENTIONS:  - Administer medication as ordered  - Assess ADL deficits and provide assistive devices as needed  - Obtain PT/OT consults as needed  - Assist and instruct patient to increase activity and self care as tolerated  Outcome: Adequate for Discharge     Problem: Ineffective Coping  Goal: Participates in unit activities  Description: Interventions:  - Provide therapeutic environment   - Provide required programming   - Redirect inappropriate behaviors   Outcome: Adequate for Discharge     Problem: DISCHARGE PLANNING  Goal: Discharge to home or other facility with appropriate resources  Description: INTERVENTIONS:  - Identify barriers to discharge w/patient and caregiver  - Arrange for needed discharge resources and transportation as appropriate  - Identify discharge learning needs (meds, wound care, etc )  - Arrange for interpretive services to assist at discharge as needed  - Refer to Case Management Department for coordinating discharge planning if the patient needs post-hospital services based on physician/advanced practitioner order or complex needs related to functional status, cognitive ability, or social support system  Outcome: Adequate for Discharge

## 2024-02-14 ENCOUNTER — HOSPITAL ENCOUNTER (EMERGENCY)
Facility: HOSPITAL | Age: 44
End: 2024-02-14
Attending: EMERGENCY MEDICINE
Payer: COMMERCIAL

## 2024-02-14 VITALS
BODY MASS INDEX: 28.7 KG/M2 | HEART RATE: 88 BPM | OXYGEN SATURATION: 98 % | RESPIRATION RATE: 16 BRPM | DIASTOLIC BLOOD PRESSURE: 73 MMHG | HEIGHT: 71 IN | SYSTOLIC BLOOD PRESSURE: 143 MMHG | TEMPERATURE: 97.5 F | WEIGHT: 205 LBS

## 2024-02-14 DIAGNOSIS — Z59.00 HOMELESSNESS: ICD-10-CM

## 2024-02-14 DIAGNOSIS — R45.851 SUICIDAL IDEATION: Primary | ICD-10-CM

## 2024-02-14 LAB
ALBUMIN SERPL-MCNC: 4.3 G/DL (ref 3.5–5.7)
ALP SERPL-CCNC: 72 IU/L (ref 34–125)
ALT SERPL-CCNC: 14 IU/L (ref 7–52)
AMPHET UR QL SCN: POSITIVE
ANION GAP SERPL CALC-SCNC: 6 MEQ/L (ref 3–15)
APAP SERPL-MCNC: <0.1 UG/ML (ref 10–30)
AST SERPL-CCNC: 13 IU/L (ref 13–39)
BARBITURATES UR QL SCN: NOT DETECTED
BASOPHILS # BLD: 0.04 K/UL (ref 0.01–0.1)
BASOPHILS NFR BLD: 0.4 %
BENZODIAZ UR QL SCN: NOT DETECTED
BILIRUB SERPL-MCNC: 0.4 MG/DL (ref 0.3–1.2)
BUN SERPL-MCNC: 13 MG/DL (ref 7–25)
CALCIUM SERPL-MCNC: 9.5 MG/DL (ref 8.6–10.3)
CANNABINOIDS UR QL SCN: NOT DETECTED
CHLORIDE SERPL-SCNC: 104 MEQ/L (ref 98–107)
CO2 SERPL-SCNC: 28 MEQ/L (ref 21–31)
COCAINE UR QL SCN: NOT DETECTED
CREAT SERPL-MCNC: 0.8 MG/DL (ref 0.7–1.3)
DIFFERENTIAL METHOD BLD: ABNORMAL
EGFRCR SERPLBLD CKD-EPI 2021: >60 ML/MIN/1.73M*2
EOSINOPHIL # BLD: 0.1 K/UL (ref 0.04–0.54)
EOSINOPHIL NFR BLD: 1.1 %
ERYTHROCYTE [DISTWIDTH] IN BLOOD BY AUTOMATED COUNT: 13.2 % (ref 11.6–14.4)
ETHANOL SERPL-MCNC: <10 MG/DL
FENTANYL URINE SCR: NOT DETECTED
GLUCOSE SERPL-MCNC: 103 MG/DL (ref 70–99)
HCT VFR BLD AUTO: 46.8 % (ref 40.1–51)
HGB BLD-MCNC: 15.8 G/DL (ref 13.7–17.5)
IMM GRANULOCYTES # BLD AUTO: 0.03 K/UL (ref 0–0.08)
IMM GRANULOCYTES NFR BLD AUTO: 0.3 %
LYMPHOCYTES # BLD: 2.52 K/UL (ref 1.2–3.5)
LYMPHOCYTES NFR BLD: 27.1 %
MCH RBC QN AUTO: 31.9 PG (ref 28–33.2)
MCHC RBC AUTO-ENTMCNC: 33.8 G/DL (ref 32.2–36.5)
MCV RBC AUTO: 94.4 FL (ref 83–98)
MONOCYTES # BLD: 0.86 K/UL (ref 0.3–1)
MONOCYTES NFR BLD: 9.3 %
NEUTROPHILS # BLD: 5.74 K/UL (ref 1.7–7)
NEUTS SEG NFR BLD: 61.8 %
NRBC BLD-RTO: 0 %
OPIATES UR QL SCN: NOT DETECTED
PCP UR QL SCN: NOT DETECTED
PDW BLD AUTO: 9 FL (ref 9.4–12.4)
PLATELET # BLD AUTO: 323 K/UL (ref 150–350)
POTASSIUM SERPL-SCNC: 4 MEQ/L (ref 3.5–5.1)
PROT SERPL-MCNC: 7.2 G/DL (ref 6–8.2)
RBC # BLD AUTO: 4.96 M/UL (ref 4.5–5.8)
SALICYLATES SERPL-MCNC: <1.5 MG/DL
SODIUM SERPL-SCNC: 138 MEQ/L (ref 136–145)
WBC # BLD AUTO: 9.29 K/UL (ref 3.8–10.5)

## 2024-02-14 PROCEDURE — 63700000 HC SELF-ADMINISTRABLE DRUG

## 2024-02-14 PROCEDURE — G0480 DRUG TEST DEF 1-7 CLASSES: HCPCS

## 2024-02-14 PROCEDURE — 80307 DRUG TEST PRSMV CHEM ANLYZR: CPT | Performed by: EMERGENCY MEDICINE

## 2024-02-14 PROCEDURE — 80307 DRUG TEST PRSMV CHEM ANLYZR: CPT

## 2024-02-14 PROCEDURE — 82040 ASSAY OF SERUM ALBUMIN: CPT | Performed by: EMERGENCY MEDICINE

## 2024-02-14 PROCEDURE — 36415 COLL VENOUS BLD VENIPUNCTURE: CPT | Performed by: EMERGENCY MEDICINE

## 2024-02-14 PROCEDURE — 85025 COMPLETE CBC W/AUTO DIFF WBC: CPT | Performed by: EMERGENCY MEDICINE

## 2024-02-14 PROCEDURE — 99285 EMERGENCY DEPT VISIT HI MDM: CPT

## 2024-02-14 RX ORDER — LORAZEPAM 1 MG/1
1 TABLET ORAL ONCE
Status: COMPLETED | OUTPATIENT
Start: 2024-02-14 | End: 2024-02-14

## 2024-02-14 RX ADMIN — LORAZEPAM 1 MG: 1 TABLET ORAL at 13:17

## 2024-02-14 SDOH — ECONOMIC STABILITY - HOUSING INSECURITY: HOMELESSNESS UNSPECIFIED: Z59.00

## 2024-02-14 ASSESSMENT — COGNITIVE AND FUNCTIONAL STATUS - GENERAL
JUDGEMENT: INTACT
THOUGHT_CONTENT: APPROPRIATE
SLEEP_WAKE_CYCLE: DECREASED
DELUSIONS: NONE OR AGE APPROPRIATE
IMPULSE CONTROL: IMPAIRED, MODERATELY
REMOTE MEMORY: WNL
CONCENTRATION: WNL
PSYCHOMOTOR FUNCTIONING: WNL
SPEECH: SOFT;REGULAR
ATTENTION: WNL
EYE_CONTACT: WNL
APPETITE: NO CHANGE
INSIGHT: INTACT
THOUGHT_PROCESS: WNL
AROUSAL LEVEL: ALERT
LIBIDO: NON-CONTRIBUTORY
APPEARANCE: WELL GROOMED
RECENT MEMORY: WNL
ORIENTATION: FULLY ORIENTED
AFFECT: FULL RANGE
MOOD: UNABLE TO ASSESS
PERCEPTUAL FUNCTION: NORMAL

## 2024-02-14 ASSESSMENT — ENCOUNTER SYMPTOMS
CHILLS: 0
FEVER: 0
VOMITING: 0
ABDOMINAL PAIN: 0
SHORTNESS OF BREATH: 0
HALLUCINATIONS: 0
DIARRHEA: 0

## 2024-02-14 NOTE — CONSULTS
Patient Information     Patient Name  Andrés Mack MRN  [id number] Legal Sex  Male  Age  1980 (43 y.o.) [facility]         Admit Date Department Dept Phone    2024 [facility] Emergency Department [phone]       Presenting Problems -      Row Name 1324       Presenting Problems    Who accompanied patient today? Pt was brought to the ED by EMS    Presenting Problems Pt is a 43-year-old male who presented to the ED for SI. Pt expressed that he oftenly have suicidal thoughts and describes them as fleeting. Pt expressed that he had an intent of stabbing himself with a knife. Pt expressed that he had difficulty controlling his thoughts today in comparison to other days.    Patient Experiencing hopelessness;sleep disturbances    Sleep Disturbances Comments Pt expressed that sleep is 'not good' and that he only sleeps well if he is in a bed.    Stressors Homelessness             Mental Status Exam -      Row Name 1326       Mental Status Exam    Arousal Level Alert    Appearance Well Groomed    Speech Soft;Regular    Psychomotor Functioning WNL    Eye Contact WNL    Orientation Fully oriented    Attention WNL    Concentration WNL    Recent Memory WNL    Remote Memory WNL    Thought Content Appropriate    Thought Process WNL    Insight Intact    Judgement intact    Impulse Control Impaired, moderately    Perceptual Function Normal    Delusions None or age appropriate    Sleeping Decreased    Appetite No Change    Libido Non-Contributory    Affect Full Range    Mood Unable to Assess             Suicide and Homicide Risk -      Row Name 1328       [facility] Suicide and Homicide Risk    Do you currently have any suicidal ideation or thoughts? Yes    Do you currently or have you had any thoughts of self-harm? Yes    Details Pt expressed that he had intent of stabbing himself with a knife    In what way did you harm yourself? Cutting    How  "many times? once    When was the last time? 2yrs ago    Do you currently have homicidal ideation or have you ever harmed anyone else?  No    Do you have easy access to firearms? No            Alcohol Use     Yes.    Comments: Minimal      Tobacco Use     Never assessed smoking status.    Smokeless Tobacco: Unknown status of smokeless tobacco use.      Vaping Use     Unknown       Drug Details     Questions Responses    Amphetamine frequency     Comment:  Past occasional use on 2/14/2024 \"\" on 2/14/2024     Cannabis frequency Never used    Comment:  Never used on 2/14/2024     Cocaine frequency Never used    Comment:  Never used on 2/14/2024     Ecstasy frequency Never used    Comment:  Never used on 2/14/2024     Hallucinogen frequency Never used    Comment:  Never used on 2/14/2024     Inhalant frequency Never used    Comment:  Never used on 2/14/2024       Problem List  Current as of 02/14/24 1349           No problems recorded      Allergies    No Known Allergies     Results (last 24 hours)     Procedure Component Value Units Date/Time    CBC and differential [[id number]]  (Abnormal) Collected: 02/14/24 1327    Order Status: Completed Specimen: Blood, Venous Updated: 02/14/24 1341     WBC 9.29 K/uL      RBC 4.96 M/uL      Hemoglobin 15.8 g/dL      Hematocrit 46.8 %      MCV 94.4 fL      MCH 31.9 pg      MCHC 33.8 g/dL      RDW 13.2 %      Platelets 323 K/uL      MPV 9.0 fL      Differential Type Auto     nRBC 0.0 %      Immature Granulocytes 0.3 %      Neutrophils 61.8 %      Lymphocytes 27.1 %      Monocytes 9.3 %      Eosinophils 1.1 %      Basophils 0.4 %      Immature Granulocytes, Absolute 0.03 K/uL      Neutrophils, Absolute 5.74 K/uL      Lymphocytes, Absolute 2.52 K/uL      Monocytes, Absolute 0.86 K/uL      Eosinophils, Absolute 0.10 K/uL      Basophils, Absolute 0.04 K/uL     Comprehensive metabolic panel [[id number]] Collected: 02/14/24 1327    Order Status: Sent Specimen: Blood, Venous Updated: 02/14/24 " 1333    ER toxicology screen, serum [[id number]] Collected: 02/14/24 1327    Order Status: Sent Specimen: Blood, Venous Updated: 02/14/24 1333    Extra Tubes [[id number]] Collected: 02/14/24 1327    Order Status: Sent Specimen: Blood, Venous Updated: 02/14/24 1333    Narrative:      The following orders were created for panel order Extra Tubes.  Procedure                               Abnormality         Status                     ---------                               -----------         ------                     Extra Tube Lt Blue[[id number]]                               In process                 Extra Tube Red[[id number]]                                   In process                 Extra Tube Lt Green[[id number]]                              In process                 Extra Tube Gold[[id number]]                                  In process                   Please view results for these tests on the individual orders.    Urine Drug Screen [[id number]]  (Abnormal) Collected: 02/14/24 1113    Order Status: Completed Specimen: Urine, Clean Catch Updated: 02/14/24 1211     PCP Scrn, Ur Not Detected     Benzodiazepine Ur Qual Not Detected     Cocaine Screen, Urine Not Detected     Amphetamine+Methamphetamine Screen, Ur Positive     Cannabinoid Screen, Urine Not Detected     Opiate Scrn, Ur Not Detected     Barbiturate Screen, Ur Not Detected     Fentanyl Screen, Urine Not Detected      Medical History     Diagnosis Date Comment Source    Hypertension         Surgical History          No past surgical history on file.       Mental Health/Substance Use Treatment - Wed February 14, 2024     Row Name 1331       Previous Mental Health Treatment    Previous Mental Health Treatment inpatient treatment    IP Treatment Provider/Reason Alcohol use    IP Treatment Compliant yes       Current Mental Health Treatment    Current Mental Health Treatment none       Previous Substance Use Treatment    Previous Substance Use Treatment  detox unit    Detox Provider/Reason Alcohol use    Detox Compliant yes       Current Substance Use Treatment    Current Substance Use Treatment none             Living Environment - Wed February 14, 2024     Row Name 1333       Living Environment    Current Living Arrangements homeless            Employment History     No employment history on file.      Family and Education     Marital Status    Single      Social Identity     Preferred Language Ethnicity Race    English Not , /a, or [place] origin White          Diagnosis Codes - Wed February 14, 2024     Row Name 0834       Diagnosis    Primary Code 1 F32.9    Primary Code Description 1 Unspecified depressive disorder    Primary Code 2 F10.99    Primary Code Description 2 Unspecified alcohol use disorder    Secondary Code 1 F90.2    Secondary Code 1 Description ADHD, Combined presentation             Recommendations/Plan - Wed February 14, 2024     Row Name 9793       Recommendations/Plan    Clinical assessment summary It is recommended that pt seek INPT mental health treatment    Recommended level of care Psychiatric, Voluntary (201)    Patient refused treatment recommendation No            Radiology Results (last 24 hours)    No matching results found     ECG Results (last 24 hours)    No matching results found     Microbiology Results     None      Home Medications    No medications reported.

## 2024-02-14 NOTE — ED PROVIDER NOTES
Emergency Medicine Note  HPI   HISTORY OF PRESENT ILLNESS       History provided by:  Patient and medical records   used: No      43-year-old male with PMH of HTN and depression presents to the ED via EMS for mental health evaluation.  Patient reports suicidal ideation with a plan to cut himself with a knife.  He is suicidal because he is homeless and does not want to sleep on the street again.  Patient does have a history of self-harm with cutting.  No self-harm recently.  He is agreeable to go inpatient.  Patient states he has no family support system.  Denies homicidal ideation.  Denies hallucinations.  Patient does endorse methamphetamine use a few days ago.  He is not taking any prescribed medicines at this time.  Reports minimal alcohol use.  Denies any physical complaints.      Patient History   PAST HISTORY     Reviewed from Nursing Triage:  Allergies  Meds  Problems       Past Medical History:   Diagnosis Date   • Hypertension        History reviewed. No pertinent surgical history.    History reviewed. No pertinent family history.    Social History     Substance Use Topics   • Alcohol use: Yes     Comment: Minimal   • Drug use: Never         Review of Systems   REVIEW OF SYSTEMS     Review of Systems   Constitutional: Negative for chills and fever.   Respiratory: Negative for shortness of breath.    Cardiovascular: Negative for chest pain.   Gastrointestinal: Negative for abdominal pain, diarrhea and vomiting.   Psychiatric/Behavioral: Positive for suicidal ideas. Negative for hallucinations.         VITALS     ED Vitals    Date/Time Temp Pulse Resp BP SpO2 [facility]   02/14/24 1102 36.4 °C (97.5 °F) 79 16 130/78 98 % SJD        Pulse Ox %: 98 % (02/14/24 1218)  Pulse Ox Interpretation: Normal (02/14/24 1218)  Heart Rate: 79 (02/14/24 1218)        Physical Exam   PHYSICAL EXAM     Physical Exam  Vitals and nursing note reviewed.   Constitutional:       General: He is not in acute  distress.     Appearance: He is well-developed.   HENT:      Head: Normocephalic and atraumatic.   Cardiovascular:      Rate and Rhythm: Normal rate and regular rhythm.   Pulmonary:      Effort: Pulmonary effort is normal. No respiratory distress.      Breath sounds: No wheezing, rhonchi or rales.   Abdominal:      General: Bowel sounds are normal. There is no distension.      Palpations: Abdomen is soft.      Tenderness: There is no abdominal tenderness.   Musculoskeletal:         General: No deformity.      Cervical back: Normal range of motion and neck supple.   Skin:     General: Skin is warm and dry.      Comments: Old scar to left forearm from previous suicide attempt   Neurological:      General: No focal deficit present.      Mental Status: He is alert and oriented to person, place, and time.   Psychiatric:         Attention and Perception: He does not perceive auditory or visual hallucinations.         Mood and Affect: Mood is depressed.         Behavior: Behavior normal.         Thought Content: Thought content includes suicidal ideation. Thought content does not include homicidal ideation. Thought content includes suicidal plan. Thought content does not include homicidal plan.           PROCEDURES     Procedures     DATA     Results     Procedure Component Value Units Date/Time    Urine Drug Screen [[id number]]  (Abnormal) Collected: 02/14/24 1113    Specimen: Urine, Clean Catch Updated: 02/14/24 1211     PCP Scrn, Ur Not Detected     Comment: Assay Detects: phencyclidine in urine. Lowest detectable concentration is 25 ng/mL of phencyclidine.        Benzodiazepine Ur Qual Not Detected     Comment: Assay Detects: benzodiazepines and metabolites at varying concentrations. Lowest detectable concentration is 200 ng/mL of oxazepam.        Cocaine Screen, Urine Not Detected     Comment: Assay Detects: benzoylecgonine and cocaine in urine. Lowest detectable concentration is 300 ng/mL of benzoylecgonine.         Amphetamine+Methamphetamine Screen, Ur Positive     Comment: Assay Detects: d-methamphetamine, d-amphetamine, methlyenedioxyamphetamine (MDA), and methlyenendioxymethamphetamine (MDMA) in urine. Lowest detectable concentration is 1000 ng/mL of d-methamphetamine.  Assay is less sensitive to MDA and MDMA (lowest detectable concentration, 2500 ng/mL) and could produce a false negative result. If MDMA overdose is suspected and the result is negative, a more specific test should be requested.        Cannabinoid Screen, Urine Not Detected     Comment: Assay Detects: cannabinoid metabolites in urine. Lowest detectable concentration is 50 ng/mL        Opiate Scrn, Ur Not Detected     Comment: Assay Detects: codeine, dihydrocodeine, hydrocodone, hydromorphone, levorphanol, morphine, morphine-3-glucuronide, norcodeine, oxycodone in urine. Lowest detectable concentration is 300 ng/mL of morphine.        Barbiturate Screen, Ur Not Detected     Comment: Assay Detects: alphenal, amobarbital, aprobarbital, barbital, butabarbital, butalbital, butethal, diallybarbital, pentobarbital, secobarbital,talbutal, and thiopental. Lowest detectable concentration is 200 ng/mL of secobarbital.        Fentanyl Screen, Urine Not Detected     Comment: Assay Detects: fentanyl metabolite in urine, lowest detectable concentration is 5 ng/mL of norfentanyl.             Imaging Results    None         No orders to display       Scoring tools                                  ED Course & MDM   MDM / ED COURSE / CLINICAL IMPRESSION / DISPO     Medical Decision Making  43-year-old male here for evaluation of suicidal ideation.  Patient is suicidal with a plan.  He is agreeable to go inpatient.  Plan to medically clear and consult crisis.    Vital signs have been reviewed. The oxygen saturation is 98% which is normal.  This document was created using Dragon dictation software.  There may be some typographical errors due to this technology.    Amount and/or  Complexity of Data Reviewed  Labs: ordered. Decision-making details documented in ED Course.          ED Course as of 02/14/24 1443   Wed Feb 14, 2024   1218 Amphetamine+Methamphetamine Screen, Ur(!): Positive [SG]   1230 Impression: SI, homelessness    Plan: medical clearance, consult crisis [SG]   1442 Patient is medically cleared.  Patient is accepted at [facility]. [SG]      ED Course User Index  [SG] Michelle Kaplan PA C     Clinical Impression      Suicidal ideation   Homelessness               Michelle Kaplan PA C  02/14/24 5053

## 2024-02-14 NOTE — BEHAVIORAL HEALTH CRISIS PROGRESS NOTE
201 adult bed search    Shivam: discharge beds available; ok to fax per Lucila.   Russell [facility]: available beds, ok to fax per Matt.  Karon: available beds, ok to fax per Crystal.  Randy: available beds, ok to fax per Jasvir.  Nathaniel-no available beds, per Giovanna.  Renetta-available beds, ok to fax per Kenya.     Clinical faxed to shivam, [facility]ell [facility], karon, [facility].

## 2024-02-14 NOTE — ED ATTESTATION NOTE
Procedures  Physical Exam  Review of Systems    2/14/202412:46 PM  I have personally seen and examined the patient.  I personally performed the key   components of the encounter and provided a substantive portion of the care and   medical decision making for this patient.     I have reviewed and agree with the PA/NP/Resident's assessment and ED plan of care, with any exceptions as documented below.  My examination, assessment and plan of care of Andrés Mack is as follows:    Patient is a 43-year-old male who presents for evaluation of suicidal ideation, patient is homeless currently and reports his suicidal ideation is related to that stress, patient has not tried to hurt himself but does report he has cut himself in the past and he would use a knife to hurt himself    Exam:   Vital signs have been reviewed, pulse ox is 98% on room air, normal  Heart: RRR, normal S1/S2  Lungs: CTA bilaterally  Abdo: soft, non-tender    Plan/Medical Decision Making: Patient is a 43-year-old male who presents for evaluation of suicidal ideation, checking labs and will ask crisis to see the patient, reevaluate afterwards      This document was created using Dragon Dictation software. There might be some typographical errors due to this technology.          Godshall, Duane K, MD  02/14/24 6497

## 2024-02-14 NOTE — BEHAVIORAL HEALTH CRISIS PROGRESS NOTE
"Pt is a 43-year-old male who presented to the ED for SI. Pt expressed that he oftenly have suicidal thoughts and describes them as fleeting. Pt expressed that he had an intent of stabbing himself with a knife. Pt expressed that he had difficulty controlling his thoughts today in comparison to other days. Pt denies HI/AVH. Pt has hx of SA where he slitted his wrist with a knife about 1 yr ago. Pt is homeless and has one child (a son). Pt describes his sleep as 'not good\" unless he is sleeping in a bed. Pt denies any current legal issues. Pt stated that he occasionally drinks alcohol and occasionally uses meth. Pt reported two treatment hx: [facility](2 yrs ago) and [facility] (10months ago) for alcohol use.Pt stated that he use a see therapist longtime ago but stopped because he felt as if it was not working for him. Pt has a mental health hx of depression and ADHD. Pt stated that his ADHD is currently untreated and that he use to take Zoloft and Adderall. Pt is agreeable to INPT mental health treatment. Pt signed 201 and transport forms. Pt and medical team are in agreement.   "

## 2024-02-14 NOTE — BEHAVIORAL HEALTH CRISIS PROGRESS NOTE
1430: Received call from [place] (April). Patient accepted by Dr. Hagan. Jhonatan scheduled for 2:50PM . Trip #[id number]

## 2024-02-14 NOTE — BEHAVIORAL HEALTH CRISIS PROGRESS NOTE
PCP    NINA MESA  Patient Information    Patient Name   Andrés Mack    Address   [address] [address]    Race   White                    Patient Legal Name   Andrés Mack    Legal Sex   Male    Date of Birth   1980                    Room   HB9    Ethnic Group   Not , /a, or Frisian origin    Language   English                    MRN   [id number]    Phone Numbers   Hm: [phone] Cell: [phone]    PCP   Nina Mesa MD                      Patient Contacts    Name Relation Home Work Mobile   petrona hall Significant Other   [phone]     Documents Filed to Patient    Power of  Living Will Clinical Unknown Study Attachment Consent Form ABN Waiver After Visit Summary Lab Result Scan Code Status [facility] MyChart Status Advance Care Planning    Not on File  Not on File  Not on File  Not on File  Not on File  Filed  Not on File  Not on File  Not on file  Pending Jump to the Activity      Auth/Cert Information    Hospital account [id number] has no auth/cert information available.     Bed Days    No auth/cert for hospital account [id number]; no bed days information is available.     Admission Information    Current Information    Attending Provider Admitting Provider Admission Type Admission Status   Godshall, Duane K, MD  Emergency Confirmed Admission - ED Roomed          Admission Date/Time Discharge Date Hospital Service Auth/Cert Status   02/14/24  1056  Emergency Medicine Incomplete          Hospital Area Unit Room/Bed    [facility] ED HB9/HB9              Hospital Account    Name Acct ID Class Status Primary Coverage   Andrés Mack [id number] Emergency Open [facility] MEDICAID ([facility]) PLAN            Guarantor Account (for Hospital Account #[id number])    Name Relation to Pt Service Area Active? Acct Type   Andrés Mack Self [facility] Yes Personal/Family   Address Phone     [address] [address]  [phone](H)              Coverage Information (for Hospital Account #[id number])    F/O Payor/Plan Precert #   [employer] FAM[facility] [employer]ICA[employer] [facility]    Subscriber Subscriber #   Andrés Mack [id number]   Address Phone   [address]   [place] [address] [phone]

## 2024-07-09 ENCOUNTER — HOSPITAL ENCOUNTER (EMERGENCY)
Facility: HOSPITAL | Age: 44
End: 2024-07-09
Attending: EMERGENCY MEDICINE | Admitting: EMERGENCY MEDICINE
Payer: COMMERCIAL

## 2024-07-09 VITALS
DIASTOLIC BLOOD PRESSURE: 93 MMHG | TEMPERATURE: 98 F | OXYGEN SATURATION: 100 % | HEART RATE: 87 BPM | WEIGHT: 215 LBS | HEIGHT: 71 IN | RESPIRATION RATE: 18 BRPM | BODY MASS INDEX: 30.1 KG/M2 | SYSTOLIC BLOOD PRESSURE: 153 MMHG

## 2024-07-09 DIAGNOSIS — R45.851 DEPRESSION WITH SUICIDAL IDEATION: Primary | ICD-10-CM

## 2024-07-09 DIAGNOSIS — F32.A DEPRESSION WITH SUICIDAL IDEATION: Primary | ICD-10-CM

## 2024-07-09 LAB
ALBUMIN SERPL-MCNC: 4.2 G/DL (ref 3.5–5.7)
ALP SERPL-CCNC: 72 IU/L (ref 34–125)
ALT SERPL-CCNC: 28 IU/L (ref 7–52)
AMPHET UR QL SCN: NOT DETECTED
ANION GAP SERPL CALC-SCNC: 6 MEQ/L (ref 3–15)
APAP SERPL-MCNC: 0.6 UG/ML (ref 10–30)
AST SERPL-CCNC: 17 IU/L (ref 13–39)
BARBITURATES UR QL SCN: NOT DETECTED
BASOPHILS # BLD: 0.04 K/UL (ref 0.01–0.1)
BASOPHILS NFR BLD: 0.5 %
BENZODIAZ UR QL SCN: NOT DETECTED
BILIRUB SERPL-MCNC: 0.4 MG/DL (ref 0.3–1.2)
BUN SERPL-MCNC: 9 MG/DL (ref 7–25)
CALCIUM SERPL-MCNC: 9.3 MG/DL (ref 8.6–10.3)
CANNABINOIDS UR QL SCN: NOT DETECTED
CHLORIDE SERPL-SCNC: 105 MEQ/L (ref 98–107)
CO2 SERPL-SCNC: 29 MEQ/L (ref 21–31)
COCAINE UR QL SCN: NOT DETECTED
CREAT SERPL-MCNC: 1 MG/DL (ref 0.7–1.3)
DIFFERENTIAL METHOD BLD: ABNORMAL
EGFRCR SERPLBLD CKD-EPI 2021: >60 ML/MIN/1.73M*2
EOSINOPHIL # BLD: 0.14 K/UL (ref 0.04–0.54)
EOSINOPHIL NFR BLD: 1.8 %
ERYTHROCYTE [DISTWIDTH] IN BLOOD BY AUTOMATED COUNT: 13.9 % (ref 11.6–14.4)
ETHANOL SERPL-MCNC: <10 MG/DL
FENTANYL URINE SCR: NOT DETECTED
GLUCOSE SERPL-MCNC: 85 MG/DL (ref 70–99)
HCT VFR BLD AUTO: 44.2 % (ref 40.1–51)
HGB BLD-MCNC: 14.8 G/DL (ref 13.7–17.5)
IMM GRANULOCYTES # BLD AUTO: 0.05 K/UL (ref 0–0.08)
IMM GRANULOCYTES NFR BLD AUTO: 0.6 %
LYMPHOCYTES # BLD: 2.25 K/UL (ref 1.2–3.5)
LYMPHOCYTES NFR BLD: 28.2 %
MCH RBC QN AUTO: 32.1 PG (ref 28–33.2)
MCHC RBC AUTO-ENTMCNC: 33.5 G/DL (ref 32.2–36.5)
MCV RBC AUTO: 95.9 FL (ref 83–98)
MONOCYTES # BLD: 0.89 K/UL (ref 0.3–1)
MONOCYTES NFR BLD: 11.2 %
NEUTROPHILS # BLD: 4.6 K/UL (ref 1.7–7)
NEUTS SEG NFR BLD: 57.7 %
NRBC BLD-RTO: 0 %
OPIATES UR QL SCN: NOT DETECTED
PCP UR QL SCN: NOT DETECTED
PDW BLD AUTO: 9.2 FL (ref 9.4–12.4)
PLATELET # BLD AUTO: 327 K/UL (ref 150–350)
POTASSIUM SERPL-SCNC: 3.9 MEQ/L (ref 3.5–5.1)
PROT SERPL-MCNC: 7.1 G/DL (ref 6–8.2)
RBC # BLD AUTO: 4.61 M/UL (ref 4.5–5.8)
SALICYLATES SERPL-MCNC: <1.5 MG/DL
SARS-COV-2 AG RESP QL IA.RAPID: NORMAL
SODIUM SERPL-SCNC: 140 MEQ/L (ref 136–145)
WBC # BLD AUTO: 7.97 K/UL (ref 3.8–10.5)

## 2024-07-09 PROCEDURE — 99285 EMERGENCY DEPT VISIT HI MDM: CPT

## 2024-07-09 PROCEDURE — 36415 COLL VENOUS BLD VENIPUNCTURE: CPT | Performed by: EMERGENCY MEDICINE

## 2024-07-09 PROCEDURE — 80307 DRUG TEST PRSMV CHEM ANLYZR: CPT | Performed by: EMERGENCY MEDICINE

## 2024-07-09 PROCEDURE — 87426 SARSCOV CORONAVIRUS AG IA: CPT | Performed by: EMERGENCY MEDICINE

## 2024-07-09 PROCEDURE — G0480 DRUG TEST DEF 1-7 CLASSES: HCPCS | Performed by: EMERGENCY MEDICINE

## 2024-07-09 PROCEDURE — 85025 COMPLETE CBC W/AUTO DIFF WBC: CPT | Performed by: EMERGENCY MEDICINE

## 2024-07-09 PROCEDURE — 63700000 HC SELF-ADMINISTRABLE DRUG: Performed by: EMERGENCY MEDICINE

## 2024-07-09 PROCEDURE — 93005 ELECTROCARDIOGRAM TRACING: CPT

## 2024-07-09 PROCEDURE — 93005 ELECTROCARDIOGRAM TRACING: CPT | Performed by: EMERGENCY MEDICINE

## 2024-07-09 PROCEDURE — 82040 ASSAY OF SERUM ALBUMIN: CPT | Performed by: EMERGENCY MEDICINE

## 2024-07-09 RX ORDER — BUPROPION HYDROCHLORIDE 150 MG/1
150 TABLET, EXTENDED RELEASE ORAL 2 TIMES DAILY
Status: DISCONTINUED | OUTPATIENT
Start: 2024-07-09 | End: 2024-07-10 | Stop reason: HOSPADM

## 2024-07-09 RX ORDER — SERTRALINE HYDROCHLORIDE 50 MG/1
100 TABLET, FILM COATED ORAL DAILY
Status: DISCONTINUED | OUTPATIENT
Start: 2024-07-09 | End: 2024-07-10 | Stop reason: HOSPADM

## 2024-07-09 RX ADMIN — BUPROPION HYDROCHLORIDE 150 MG: 150 TABLET, FILM COATED, EXTENDED RELEASE ORAL at 21:19

## 2024-07-09 RX ADMIN — SERTRALINE 100 MG: 50 TABLET, FILM COATED ORAL at 21:19

## 2024-07-09 ASSESSMENT — COGNITIVE AND FUNCTIONAL STATUS - GENERAL
APPEARANCE: DISHEVELED
THOUGHT_PROCESS: WNL
THOUGHT_CONTENT: APPROPRIATE
EYE_CONTACT: EYE OPENING TO STIMULAITON
CONCENTRATION: WNL
PERCEPTUAL FUNCTION: NORMAL
REMOTE MEMORY: WNL
ORIENTATION: FULLY ORIENTED
APPETITE: NO CHANGE
IMPULSE CONTROL: INTACT
SLEEP_WAKE_CYCLE: NO CHANGE
AROUSAL LEVEL: LETHARGIC
DELUSIONS: NONE OR AGE APPROPRIATE
LIBIDO: NON-CONTRIBUTORY
AFFECT: FULL RANGE
ATTENTION: WNL
JUDGEMENT: INTACT
SPEECH: SOFT
MOOD: DEPRESSED
RECENT MEMORY: WNL
INSIGHT: IMPAIRED, MINIMALLY
PSYCHOMOTOR FUNCTIONING: WNL

## 2024-07-09 ASSESSMENT — ENCOUNTER SYMPTOMS
FACIAL ASYMMETRY: 0
NERVOUS/ANXIOUS: 1
SPEECH DIFFICULTY: 0
ABDOMINAL PAIN: 0
SHORTNESS OF BREATH: 0
FEVER: 0
DYSPHORIC MOOD: 1
EYE REDNESS: 0
WEAKNESS: 0

## 2024-07-09 NOTE — ED PROVIDER NOTES
Emergency Medicine Note  HPI   HISTORY OF PRESENT ILLNESS     44-year-old male who has a history of anxiety and depression presents to the ER for evaluation of worsening mood with suicidal thoughts.  Patient states been having thoughts of cutting himself.  He states that he does use recreational drugs including methamphetamines.  He states he drinks occasionally.  He states he did recently become homeless and he thinks that may have worsened his mental health symptomatology.      Depression  Location:  Suicidal  Severity:  Moderate  Onset quality:  Gradual  Duration:  1 week  Timing:  Intermittent  Progression:  Waxing and waning  Chronicity:  Recurrent  Associated symptoms: no abdominal pain, no chest pain, no fever and no shortness of breath          Patient History   PAST HISTORY     Reviewed from Nursing Triage:       Past Medical History:   Diagnosis Date   • Hypertension        No past surgical history on file.    No family history on file.    Social History     Tobacco Use   • Smoking status: Unknown   Vaping Use   • Vaping Use: Unknown   Substance Use Topics   • Alcohol use: Yes     Comment: Minimal   • Drug use: Yes     Types: Methamphetamines     Comment: Pt expresssed that he uses meth occasionally         Review of Systems   REVIEW OF SYSTEMS     Review of Systems   Constitutional:  Negative for fever.   Eyes:  Negative for redness.   Respiratory:  Negative for shortness of breath.    Cardiovascular:  Negative for chest pain.   Gastrointestinal:  Negative for abdominal pain.   Neurological:  Negative for facial asymmetry, speech difficulty and weakness.   Psychiatric/Behavioral:  Positive for dysphoric mood and suicidal ideas. The patient is nervous/anxious.          VITALS     ED Vitals      Date/Time Temp Pulse Resp BP SpO2 [facility]   07/09/24 1750 36.7 °C (98 °F) 87 18 153/93 100 % ACL          Pulse Ox %: 100 % (07/09/24 2021)  Pulse Ox Interpretation: Normal (07/09/24 2021)           Physical Exam    PHYSICAL EXAM     Physical Exam  Constitutional:       Appearance: He is not ill-appearing or toxic-appearing.   Eyes:      Extraocular Movements: Extraocular movements intact.      Conjunctiva/sclera: Conjunctivae normal.   Pulmonary:      Effort: No respiratory distress.   Abdominal:      General: There is no distension.   Musculoskeletal:         General: Normal range of motion.   Neurological:      Mental Status: He is alert and oriented to person, place, and time.      Cranial Nerves: No cranial nerve deficit.      Motor: No weakness.           PROCEDURES     Procedures     DATA     Results       Procedure Component Value Units Date/Time    SARS-COV-2, ANTIGEN Nares [[id number]]  (Normal) Collected: 07/09/24 1913    Specimen: Nasal Swab from Nares Updated: 07/09/24 1939     SARS-COV-2 (COVID-19), Antigen Presumptive Negative, no Ag detected    Narrative:      This test is approved by FDA under EUA use. The performance of this test is not validated for asymptomatic patients and test results should be correlated with patient's condition.        Toxicology Screen, serum [[id number]]  (Abnormal) Collected: 07/09/24 1803    Specimen: Blood, Venous Updated: 07/09/24 1842     Salicylate <1.5 mg/dL      Acetaminophen 0.6 ug/mL      Ethanol <10 mg/dL     CBC and differential [[id number]]  (Abnormal) Collected: 07/09/24 1803    Specimen: Blood, Venous Updated: 07/09/24 1839     WBC 7.97 K/uL      RBC 4.61 M/uL      Hemoglobin 14.8 g/dL      Hematocrit 44.2 %      MCV 95.9 fL      MCH 32.1 pg      MCHC 33.5 g/dL      RDW 13.9 %      Platelets 327 K/uL      MPV 9.2 fL      Differential Type Auto     nRBC 0.0 %      Immature Granulocytes 0.6 %      Neutrophils 57.7 %      Lymphocytes 28.2 %      Monocytes 11.2 %      Eosinophils 1.8 %      Basophils 0.5 %      Immature Granulocytes, Absolute 0.05 K/uL      Neutrophils, Absolute 4.60 K/uL      Lymphocytes, Absolute 2.25 K/uL      Monocytes, Absolute 0.89 K/uL       Eosinophils, Absolute 0.14 K/uL      Basophils, Absolute 0.04 K/uL     Comprehensive metabolic panel [[id number]]  (Normal) Collected: 07/09/24 1803    Specimen: Blood, Venous Updated: 07/09/24 1835     Sodium 140 mEQ/L      Potassium 3.9 mEQ/L      Comment: Results obtained on plasma. Plasma Potassium values may be up to 0.4 mEQ/L less than serum values. The differences may be greater for patients with high platelet or white cell counts.        Chloride 105 mEQ/L      CO2 29 mEQ/L      BUN 9 mg/dL      Creatinine 1.0 mg/dL      Glucose 85 mg/dL      Calcium 9.3 mg/dL      AST (SGOT) 17 IU/L      ALT (SGPT) 28 IU/L      Alkaline Phosphatase 72 IU/L      Total Protein 7.1 g/dL      Comment: Test performed on plasma which typically contains approximately 0.4 g/dL more protein than serum.        Albumin 4.2 g/dL      Bilirubin, Total 0.4 mg/dL      eGFR >60.0 mL/min/1.73m*2      Comment: Calculation based on the Chronic Kidney Disease Epidemiology Collaboration (CKD-EPI) equation refit without adjustment for race.        Anion Gap 6 mEQ/L             Imaging Results    None         ECG 12 lead   Independent Interpretation by ED Provider   Sinus 70 bpm.  QRS and QTc normal.  No ischemia.          Scoring tools                                  ED Course & MDM   MDM / ED COURSE / CLINICAL IMPRESSION / DISPO     Medical Decision Making  Patient presents emergency room for evaluation of depression and suicidality.  Labs unremarkable.  Behavioral health consult placed.  On one-to-one monitoring.  Psych hold For placement.    Problems Addressed:  Depression with suicidal ideation: complicated acute illness or injury that poses a threat to life or bodily functions    Amount and/or Complexity of Data Reviewed  Labs: ordered.  ECG/medicine tests: ordered and independent interpretation performed.    Risk  Decision regarding hospitalization.        ED Course as of 07/09/24 2205 Tue Jul 09, 2024 2205 Pt accepted at [facility].   [MR]      ED Course User Index  [MR] Raimundo Jesus,      Clinical Impression      Depression with suicidal ideation     _________________       ED Disposition   Transfer to Behavioral Health                       Raimundo Jesus DO  07/09/24 2025

## 2024-07-10 LAB
ATRIAL RATE: 70
P AXIS: 31
PR INTERVAL: 116
QRS DURATION: 88
QT INTERVAL: 414
QTC CALCULATION(BAZETT): 447
R AXIS: 44
T WAVE AXIS: 64
VENTRICULAR RATE: 70

## 2024-07-10 PROCEDURE — 93010 ELECTROCARDIOGRAM REPORT: CPT | Performed by: INTERNAL MEDICINE

## 2024-07-10 NOTE — CONSULTS
Patient Information       Patient Name  Andrés Mack MRN  [id number] Legal Sex  Male  Age  1980 (44 y.o.) [facility]             Admit Date Department Dept Phone    2024 [facility] [phone]           Presenting Problems -        Row Name        Presenting Problems    Who accompanied patient today? Pt alone    Presenting Problems 45yo male with history of depression and ADHD presents to the emergency department reporting suicidal thoughts. Pt reports arriving to the emergency department as a walk-in reporting +SI with plan to cut his arm with a knife. Pt reports suicidals thoughts for the past two weeks regarding multiple life stressors/ Pt reports he’s been homeless for the past 5 years and has been unable to find a job. Pt reports his girlfriend left him one week ago which increased pt’s suicidal thoughts stating, “I had to get off the street before I hurt myself”. Pt reports occasional alcohol and methamphetamine use reporting he hardly uses because he has no income for it. Pt report history of Sib reports cutting his arm two weeks ago. Pt reports history of multiple suicide attempts reporting his last attempt was two years ago where pt cut his wrist with a knife requiring stiches and an inpatient psychiatric hospitalization. Pt reports his last psych admission was at [facility] in 2024. Pt denies outpatient supports and treatment. Pt denies legal issues, access to firearms, [place], and AV[facility].                   Mental Status Exam -        Row Name        Mental Status Exam    Arousal Level Lethargic    Appearance Disheveled    Speech Soft    Psychomotor Functioning WNL    Eye Contact Eye opening to stimulaiton    Orientation Fully oriented    Attention WNL    Concentration WNL    Recent Memory WNL    Remote Memory WNL    Thought Content Appropriate    Thought Process WNL    Insight Impaired, minimally    Judgement intact    Impulse  Control Intact    Perceptual Function Normal    Delusions None or age appropriate    Sleeping No Change    Appetite No Change    Libido Non-Contributory    Affect Full Range    Mood Depressed                   Suicide and Homicide Risk - Tue July 09, 2024       Row Name 2054       [facility] Suicide and Homicide Risk    Do you currently have any suicidal ideation or thoughts? Yes    Do you currently or have you had any thoughts of self-harm? No    Do you currently have homicidal ideation or have you ever harmed anyone else?  No    Do you have easy access to firearms? No                   Safe-T Assessment - Tue July 09, 2024       Row Name 2054       SAFE-T Assessment Risk Factors      Suicidal Behavior History of prior suicide attempts    Current/Past Psychiatric Disorders Mood disorders;ADHD;ETOH/Substance abuse    Key symptoms Hopelessness    Precipitants/Stressors/Interpersonal/Triggers Intoxication;Inadequate social support;Events leading to humiliation, shame or despair    Change in Treatment Non-compliant or not receiving treatment    Access to fire arms. No       SAFE-T Assessment Protective Factors    Internal factors Frustration tolerance    External Factors Responsibility to children       SAFE-T Assessment Suicidal Inquiry     In the last month, how many times have you had suicidal thoughts? Daily or almost daily    In the last month, when you have had suicidal thoughts, how long do they last? 1-4 hours/a lot of time    In the last month, could/can you stop thinking about killing yourself or wanting to die if you want to? Can control thoughts with alot of difficulty    In the last month, are there things - anyone or anything (i.e. family, [employer], pain of death) - that stopped you from wanting to die or acting on thoughts of suicide?  Does not apply    In the last month, what sorts of reasons did you have for thinking about wanting to die or killing yourself? Does not apply.                  Alcohol Use        "Yes.    Comments: Minimal          Tobacco Use       Never assessed smoking status.    Smokeless Tobacco: Unknown status of smokeless tobacco use.          Vaping Use       Unknown           Drug Details       Questions Responses    Amphetamine frequency     Comment:  Past occasional use on 2/14/2024 \"\" on 2/14/2024     Cannabis frequency Never used    Comment:  Never used on 2/14/2024     Cocaine frequency Never used    Comment:  Never used on 2/14/2024     Ecstasy frequency Never used    Comment:  Never used on 2/14/2024     Hallucinogen frequency Never used    Comment:  Never used on 2/14/2024     Inhalant frequency Never used    Comment:  Never used on 2/14/2024           Problem List  Current as of 07/09/24 2056             No problems recorded          Allergies    No Known Allergies       Results (last 24 hours)       Procedure Component Value Units Date/Time    Toxicology Screen, serum [[id number]]  (Abnormal) Collected: 07/09/24 1803    Order Status: Completed Specimen: Blood, Venous Updated: 07/09/24 1842     Salicylate <1.5 mg/dL      Acetaminophen 0.6 ug/mL      Ethanol <10 mg/dL     CBC and differential [[id number]]  (Abnormal) Collected: 07/09/24 1803    Order Status: Completed Specimen: Blood, Venous Updated: 07/09/24 1839     WBC 7.97 K/uL      RBC 4.61 M/uL      Hemoglobin 14.8 g/dL      Hematocrit 44.2 %      MCV 95.9 fL      MCH 32.1 pg      MCHC 33.5 g/dL      RDW 13.9 %      Platelets 327 K/uL      MPV 9.2 fL      Differential Type Auto     nRBC 0.0 %      Immature Granulocytes 0.6 %      Neutrophils 57.7 %      Lymphocytes 28.2 %      Monocytes 11.2 %      Eosinophils 1.8 %      Basophils 0.5 %      Immature Granulocytes, Absolute 0.05 K/uL      Neutrophils, Absolute 4.60 K/uL      Lymphocytes, Absolute 2.25 K/uL      Monocytes, Absolute 0.89 K/uL      Eosinophils, Absolute 0.14 K/uL      Basophils, Absolute 0.04 K/uL     Comprehensive metabolic panel [[id number]]  (Normal) Collected: 07/09/24 " 1803    Order Status: Completed Specimen: Blood, Venous Updated: 07/09/24 1835     Sodium 140 mEQ/L      Potassium 3.9 mEQ/L      Chloride 105 mEQ/L      CO2 29 mEQ/L      BUN 9 mg/dL      Creatinine 1.0 mg/dL      Glucose 85 mg/dL      Calcium 9.3 mg/dL      AST (SGOT) 17 IU/L      ALT (SGPT) 28 IU/L      Alkaline Phosphatase 72 IU/L      Total Protein 7.1 g/dL      Albumin 4.2 g/dL      Bilirubin, Total 0.4 mg/dL      eGFR >60.0 mL/min/1.73m*2      Anion Gap 6 mEQ/L     Urine Drug Screen [[id number]]     Order Status: Sent Specimen: Urine, Clean Catch     Drug screen panel, urine [[id number]]     Order Status: Sent Specimen: Urine, Clean Catch           Medical History       Diagnosis Date Comment Source    Hypertension             Surgical History            No past surgical history on file.           Mental Health/Substance Use Treatment - Tue July 09, 2024       Row Name 2055       Previous Mental Health Treatment    Previous Mental Health Treatment inpatient treatment    IP Treatment Provider/Reason [facility] March 2024       Current Mental Health Treatment    Current Mental Health Treatment none       Previous Substance Use Treatment    Previous Substance Use Treatment none       Current Substance Use Treatment    Current Substance Use Treatment none                   Living Environment - Tue July 09, 2024       Row Name 2055       Living Environment    People in Home alone    Current Living Arrangements homeless    Able to Return to Prior Arrangements yes       County Agency Involved    County Agencies Involved? No                  Employment History       No employment history on file.          Family and Education       Marital Status    Single          Social Identity       Preferred Language Ethnicity Race    English Not , /a, or Yakut origin White              Diagnosis Codes - Tue July 09, 2024       Row Name 2055       Diagnosis    Primary Code 1 F32.9    Primary Code Description 1 MDD                    Recommendations/Plan - Tue July 09, 2024       Row Name 2055       Recommendations/Plan    Clinical assessment summary [facility] recommends inpatient admission for stabilization, medication management and therapuetic intervention on a voluntary admission.    Recommended level of care Psychiatric, Voluntary (201)    Patient refused treatment recommendation No    Suicide Resource Information Provided yes       Plan/Follow-up    Tobacco Cessation Discharge Follow-up Declined                  Radiology Results (last 24 hours)    No matching results found          ECG Results (last 24 hours)        ECG 12 lead [[id number]]  Resulted: 07/09/24 1810, Result status: Preliminary result     Ordering provider: Raimundo Jesus,   07/09/24 1752 Resulting lab: [facility]   Narrative:  Normal sinus rhythm  Normal ECG  No previous ECGs available      Components      Component Value Flag   Ventricular rate 70  --   Atrial rate 70  --   DE Interval 116  --   QRS duration 88  --   QT Interval 414  --   QTC Calculation(Bazett) 447  --   P Axis 31  --   R Axis 44  --   T Wave Axis 64  --                  ECG 12 lead [[id number]]  Resulted: 07/09/24 1755, Result status: Preliminary result     Ordering provider: Raimundo Jesus,   07/09/24 1752 Resulting lab: [facility]USE   Narrative:  Normal sinus rhythm  Normal ECG  No previous ECGs available      Components      Component Value Flag   Ventricular rate 70  --   Atrial rate 70  --   DE Interval 116  --   QRS duration 88  --   QT Interval 414  --   QTC Calculation(Bazett) 447  --   P Axis 31  --   R Axis 44  --   T Wave Axis 64  --                          Microbiology Results       Procedure Component Value Units Date/Time    SARS-COV-2, ANTIGEN Nares [[id number]]  (Normal) Collected: 07/09/24 1913    Specimen: Nasal Swab from Nares Updated: 07/09/24 1939     SARS-COV-2 (COVID-19), Antigen Presumptive Negative, no Ag detected    Narrative:      This test is approved by FDA under EUA use.  The performance of this test is not validated for asymptomatic patients and test results should be correlated with patient's condition.              Home Medications    No medications on file.

## 2024-07-10 NOTE — PROGRESS NOTES
[facility] completed psych consult for pt who reports suicidal thoughts. [facility] recommends inpatient admission on a voluntary admission. [facility] provided pt with voluntary rights and pt was able to sign 201 and transfer form. [facility] faxed clinical to [facility] for review. [facility] spoke with Tuesday at [facility] who has accepted pt on a voluntary admission. The accepting physician will be Dr. Thaddeus valentine. [facility] scheduled transport with qamar at [facility] with trip#[id number] ETA 12am. [facility] made treatment team aware.

## 2024-07-10 NOTE — BEHAVIORAL HEALTH CRISIS PROGRESS NOTE
PCP    NINA MESA  Patient Information    Patient Name   Andrés Mack    Address   [address] [place] [address]    Race   White                    Patient Legal Name   Andrés Mack    Legal Sex   Male    Date of Birth   1980                    Room   AC24    Ethnic Group   Not , /a, or Croatian origin    Language   English                    MRN   [id number]    Phone Numbers   Hm: [phone] Cell: [phone]    PCP   Nina Mesa MD                      Patient Contacts    Name Relation Home Work Mobile   petrona hall Significant Other   [phone]     Documents Filed to Patient    Power of  Living Will Clinical Unknown Study Attachment Consent Form ABN Waiver After Visit Summary Lab Result Scan Code Status [facility] MyChart Status Advance Care Planning    Not on File  Not on File  Not on File  Not on File  Not on File  Filed  Filed  Not on File  Not on file  Code  Jump to the Activity      Auth/Cert Information    Hospital account [id number] has no auth/cert information available.     Bed Days    No auth/cert for hospital account [id number]; no bed days information is available.     Admission Information    Current Information    Attending Provider Admitting Provider Admission Type Admission Status   Raimundo Jesus DO  Emergency Confirmed Admission - ED Roomed          Admission Date/Time Discharge Date Hospital Service Auth/Cert Status   24  1755  Emergency Medicine Incomplete          Hospital Area Unit Room/Bed    [facility] ED AC24/AC24              Hospital Account    Name Acct ID Class Status Primary Coverage   Andrés Mack [id number] Emergency Open [facility]          Guarantor Account (for Hospital Account #[id number])    Name Relation to Pt Service Area Active? Acct Type   Andrés Mack Self [facility] Yes Personal/Family   Address Phone     [address] [place] [address]  [phone](H)            Coverage Information (for Hospital Account #[id number])    F/O Payor/Plan Precert #   [employer] FAMILY [employer]ICA[employer] ([employer]ALTH[employer]ICES) PLAN    Subscriber Subscriber #   Andrés Mack [id number]   Address Phone   [address] [place] [place] [address] [phone]   Merrill Mcnally LPC  Behavioral Health Crisis Clinician  Specialty: Behavioral Health     Consults      Signed     Date of Service: 2024  8:56 PM  Consult Orders   Inpatient consult to Behavioral Health Crisis [[id number]] ordered by Raimundo Jesus DO at 24 1750          Signed         Patient Information         Patient Name  Andrés Mack MRN  [id number] Legal Sex  Male  Age  1980 (44 y.o.) N                 Admit Date Department Dept Phone     2024 [place] [phone]               Presenting Problems - Tue 2024         Row Name            Presenting Problems     Who accompanied patient today? Pt alone     Presenting Problems 43yo male with history of depression and ADHD presents to the emergency department reporting suicidal thoughts. Pt reports arriving to the emergency department as a walk-in reporting +SI with plan to cut his arm with a knife. Pt reports suicidals thoughts for the past two weeks regarding multiple life stressors/ Pt reports he’s been homeless for the past 5 years and has been unable to find a job. Pt reports his girlfriend left him one week ago which increased pt’s suicidal thoughts stating, “I had to get off the street before I hurt myself”. Pt reports occasional alcohol and methamphetamine use reporting he hardly uses because he has no income for it. Pt report history of Sib reports cutting his arm two weeks ago. Pt reports history of multiple suicide attempts reporting his last attempt was two years ago where pt cut his wrist with a knife requiring stiches and an inpatient psychiatric hospitalization. Pt reports his last psych  admission was at [facility] in March 2024. Pt denies outpatient supports and treatment. Pt denies legal issues, access to firearms, [place], and AVH.                         Mental Status Exam - Tue July 09, 2024         Row Name 2053           Mental Status Exam     Arousal Level Lethargic     Appearance Disheveled     Speech Soft     Psychomotor Functioning WNL     Eye Contact Eye opening to stimulaiton     Orientation Fully oriented     Attention WNL     Concentration WNL     Recent Memory WNL     Remote Memory WNL     Thought Content Appropriate     Thought Process WNL     Insight Impaired, minimally     Judgement intact     Impulse Control Intact     Perceptual Function Normal     Delusions None or age appropriate     Sleeping No Change     Appetite No Change     Libido Non-Contributory     Affect Full Range     Mood Depressed                         Suicide and Homicide Risk - Tue July 09, 2024         Row Name 2054           [facility] Suicide and Homicide Risk     Do you currently have any suicidal ideation or thoughts? Yes     Do you currently or have you had any thoughts of self-harm? No     Do you currently have homicidal ideation or have you ever harmed anyone else?  No     Do you have easy access to firearms? No                         Safe-T Assessment - Tue July 09, 2024         Row Name 2054           SAFE-T Assessment Risk Factors       Suicidal Behavior History of prior suicide attempts     Current/Past Psychiatric Disorders Mood disorders;ADHD;ETOH/Substance abuse     Key symptoms Hopelessness     Precipitants/Stressors/Interpersonal/Triggers Intoxication;Inadequate social support;Events leading to humiliation, shame or despair     Change in Treatment Non-compliant or not receiving treatment     Access to fire arms. No           SAFE-T Assessment Protective Factors     Internal factors Frustration tolerance     External Factors Responsibility to children           SAFE-T Assessment Suicidal Inquiry      In the  "last month, how many times have you had suicidal thoughts? Daily or almost daily     In the last month, when you have had suicidal thoughts, how long do they last? 1-4 hours/a lot of time     In the last month, could/can you stop thinking about killing yourself or wanting to die if you want to? Can control thoughts with alot of difficulty     In the last month, are there things - anyone or anything (i.e. family, Muslim, pain of death) - that stopped you from wanting to die or acting on thoughts of suicide?  Does not apply     In the last month, what sorts of reasons did you have for thinking about wanting to die or killing yourself? Does not apply.                       Alcohol Use         Yes.     Comments: Minimal             Tobacco Use         Never assessed smoking status.     Smokeless Tobacco: Unknown status of smokeless tobacco use.             Vaping Use         Unknown              Drug Details         Questions Responses     Amphetamine frequency       Comment:  Past occasional use on 2/14/2024 \"\" on 2/14/2024       Cannabis frequency Never used     Comment:  Never used on 2/14/2024       Cocaine frequency Never used     Comment:  Never used on 2/14/2024       Ecstasy frequency Never used     Comment:  Never used on 2/14/2024       Hallucinogen frequency Never used     Comment:  Never used on 2/14/2024       Inhalant frequency Never used     Comment:  Never used on 2/14/2024               Problem List  Current as of 07/09/24 2056                No problems recorded             Allergies    No Known Allergies         Results (last 24 hours)         Procedure Component Value Units Date/Time     Toxicology Screen, serum [[id number]]  (Abnormal) Collected: 07/09/24 1803     Order Status: Completed Specimen: Blood, Venous Updated: 07/09/24 1842       Salicylate <1.5 mg/dL         Acetaminophen 0.6 ug/mL         Ethanol <10 mg/dL       CBC and differential [[id number]]  (Abnormal) Collected: 07/09/24 1803     " Order Status: Completed Specimen: Blood, Venous Updated: 07/09/24 1839       WBC 7.97 K/uL         RBC 4.61 M/uL         Hemoglobin 14.8 g/dL         Hematocrit 44.2 %         MCV 95.9 fL         MCH 32.1 pg         MCHC 33.5 g/dL         RDW 13.9 %         Platelets 327 K/uL         MPV 9.2 fL         Differential Type Auto       nRBC 0.0 %         Immature Granulocytes 0.6 %         Neutrophils 57.7 %         Lymphocytes 28.2 %         Monocytes 11.2 %         Eosinophils 1.8 %         Basophils 0.5 %         Immature Granulocytes, Absolute 0.05 K/uL         Neutrophils, Absolute 4.60 K/uL         Lymphocytes, Absolute 2.25 K/uL         Monocytes, Absolute 0.89 K/uL         Eosinophils, Absolute 0.14 K/uL         Basophils, Absolute 0.04 K/uL       Comprehensive metabolic panel [[id number]]  (Normal) Collected: 07/09/24 1803     Order Status: Completed Specimen: Blood, Venous Updated: 07/09/24 1835       Sodium 140 mEQ/L         Potassium 3.9 mEQ/L         Chloride 105 mEQ/L         CO2 29 mEQ/L         BUN 9 mg/dL         Creatinine 1.0 mg/dL         Glucose 85 mg/dL         Calcium 9.3 mg/dL         AST (SGOT) 17 IU/L         ALT (SGPT) 28 IU/L         Alkaline Phosphatase 72 IU/L         Total Protein 7.1 g/dL         Albumin 4.2 g/dL         Bilirubin, Total 0.4 mg/dL         eGFR >60.0 mL/min/1.73m*2         Anion Gap 6 mEQ/L       Urine Drug Screen [[id number]]       Order Status: Sent Specimen: Urine, Clean Catch       Drug screen panel, urine [[id number]]       Order Status: Sent Specimen: Urine, Clean Catch               Medical History         Diagnosis Date Comment Source     Hypertension                   Surgical History               No past surgical history on file.               Mental Health/Substance Use Treatment - Tue July 09, 2024         Row Name 2055           Previous Mental Health Treatment     Previous Mental Health Treatment inpatient treatment     IP Treatment Provider/Reason [facility] March  2024           Current Mental Health Treatment     Current Mental Health Treatment none           Previous Substance Use Treatment     Previous Substance Use Treatment none           Current Substance Use Treatment     Current Substance Use Treatment none                         Living Environment - Tue July 09, 2024         Row Name 2055           Living Environment     People in Home alone     Current Living Arrangements homeless     Able to Return to Prior Arrangements yes           County Agency Involved     County Agencies Involved? No                       Employment History         No employment history on file.             Family and Education         Marital Status     Single             Social Identity         Preferred Language Ethnicity Race     English Not , /a, or Urdu origin White                  Diagnosis Codes - Tue July 09, 2024         Row Name 2055           Diagnosis     Primary Code 1 F32.9     Primary Code Description 1 MDD                         Recommendations/Plan - Tue July 09, 2024         Row Name 2055           Recommendations/Plan     Clinical assessment summary [facility] recommends inpatient admission for stabilization, medication management and therapuetic intervention on a voluntary admission.     Recommended level of care Psychiatric, Voluntary (201)     Patient refused treatment recommendation No     Suicide Resource Information Provided yes           Plan/Follow-up     Tobacco Cessation Discharge Follow-up Declined                       Radiology Results (last 24 hours)    No matching results found             ECG Results (last 24 hours)          ECG 12 lead [[id number]]  Resulted: 07/09/24 1810, Result status: Preliminary result      Ordering provider: Raimundo Jesus DO  07/09/24 1752 Resulting lab: [facility]   Narrative:  Normal sinus rhythm  Normal ECG  No previous ECGs available        Components       Component Value Flag   Ventricular rate 70  --   Atrial rate 70   --   WY Interval 116  --   QRS duration 88  --   QT Interval 414  --   QTC Calculation(Bazett) 447  --   P Axis 31  --   R Axis 44  --   T Wave Axis 64  --                       ECG 12 lead [[id number]]  Resulted: 07/09/24 1755, Result status: Preliminary result      Ordering provider: Raimundo Jesus DO  07/09/24 1752 Resulting lab: [facility]   Narrative:  Normal sinus rhythm  Normal ECG  No previous ECGs available        Components       Component Value Flag   Ventricular rate 70  --   Atrial rate 70  --   WY Interval 116  --   QRS duration 88  --   QT Interval 414  --   QTC Calculation(Bazett) 447  --   P Axis 31  --   R Axis 44  --   T Wave Axis 64  --                                  Microbiology Results         Procedure Component Value Units Date/Time     SARS-COV-2, ANTIGEN Nares [[id number]]  (Normal) Collected: 07/09/24 1913     Specimen: Nasal Swab from Nares Updated: 07/09/24 1939       SARS-COV-2 (COVID-19), Antigen Presumptive Negative, no Ag detected     Narrative:       This test is approved by FDA under EUA use. The performance of this test is not validated for asymptomatic patients and test results should be correlated with patient's condition.                   Home Medications    No medications on file.                   Electronically signed by Merrill Mcnally LPC at 7/9/2024  8:56    Vital Signs (last day)    Date/Time Temp Pulse Resp BP SpO2   07/09/24 1750 36.7 (98) 87 18 153/93 Abnormal  100